# Patient Record
Sex: FEMALE | Race: WHITE | Employment: UNEMPLOYED | URBAN - METROPOLITAN AREA
[De-identification: names, ages, dates, MRNs, and addresses within clinical notes are randomized per-mention and may not be internally consistent; named-entity substitution may affect disease eponyms.]

---

## 2017-07-03 ENCOUNTER — HOSPITAL ENCOUNTER (OUTPATIENT)
Dept: WOMENS IMAGING | Age: 77
Discharge: HOME | End: 2017-07-03
Attending: INTERNAL MEDICINE | Admitting: INTERNAL MEDICINE

## 2017-07-03 DIAGNOSIS — Z12.31 VISIT FOR SCREENING MAMMOGRAM: ICD-10-CM

## 2017-07-03 DIAGNOSIS — Z12.31 ENCOUNTER FOR SCREENING MAMMOGRAM FOR MALIGNANT NEOPLASM OF BREAST: ICD-10-CM

## 2017-07-10 ENCOUNTER — HOSPITAL ENCOUNTER (OUTPATIENT)
Dept: ULTRASOUND IMAGING | Age: 77
Discharge: HOME | End: 2017-07-10
Attending: INTERNAL MEDICINE | Admitting: INTERNAL MEDICINE

## 2017-07-10 DIAGNOSIS — R92.8 OTHER ABNORMAL AND INCONCLUSIVE FINDINGS ON DIAGNOSTIC IMAGING OF BREAST: ICD-10-CM

## 2017-07-10 DIAGNOSIS — R92.8 ABNORMAL MAMMOGRAM: ICD-10-CM

## 2018-12-02 ENCOUNTER — HOSPITAL ENCOUNTER (INPATIENT)
Age: 78
LOS: 1 days | Discharge: HOME | DRG: 640 | End: 2018-12-03
Attending: EMERGENCY MEDICINE | Admitting: INTERNAL MEDICINE
Payer: MEDICARE

## 2018-12-02 ENCOUNTER — APPOINTMENT (OUTPATIENT)
Dept: CT IMAGING | Age: 78
DRG: 640 | End: 2018-12-02
Payer: MEDICARE

## 2018-12-02 ENCOUNTER — APPOINTMENT (OUTPATIENT)
Dept: GENERAL RADIOLOGY | Age: 78
DRG: 640 | End: 2018-12-02
Payer: MEDICARE

## 2018-12-02 DIAGNOSIS — R11.2 NON-INTRACTABLE VOMITING WITH NAUSEA, UNSPECIFIED VOMITING TYPE: ICD-10-CM

## 2018-12-02 DIAGNOSIS — E87.1 HYPONATREMIA: Primary | ICD-10-CM

## 2018-12-02 DIAGNOSIS — E87.6 HYPOKALEMIA: ICD-10-CM

## 2018-12-02 DIAGNOSIS — R41.82 ALTERED MENTAL STATUS, UNSPECIFIED ALTERED MENTAL STATUS TYPE: ICD-10-CM

## 2018-12-02 PROBLEM — G93.41 ACUTE METABOLIC ENCEPHALOPATHY: Status: ACTIVE | Noted: 2018-12-02

## 2018-12-02 LAB
A/G RATIO: 1.7 (ref 1.1–2.2)
ALBUMIN SERPL-MCNC: 4 G/DL (ref 3.4–5)
ALP BLD-CCNC: 37 U/L (ref 40–129)
ALT SERPL-CCNC: 13 U/L (ref 10–40)
ANION GAP SERPL CALCULATED.3IONS-SCNC: 15 MMOL/L (ref 3–16)
ANION GAP SERPL CALCULATED.3IONS-SCNC: 16 MMOL/L (ref 3–16)
AST SERPL-CCNC: 20 U/L (ref 15–37)
BASOPHILS ABSOLUTE: 0 K/UL (ref 0–0.2)
BASOPHILS RELATIVE PERCENT: 0.4 %
BILIRUB SERPL-MCNC: 0.8 MG/DL (ref 0–1)
BILIRUBIN URINE: NEGATIVE
BLOOD, URINE: NEGATIVE
BUN BLDV-MCNC: 7 MG/DL (ref 7–20)
BUN BLDV-MCNC: 8 MG/DL (ref 7–20)
CALCIUM SERPL-MCNC: 8.8 MG/DL (ref 8.3–10.6)
CALCIUM SERPL-MCNC: 8.8 MG/DL (ref 8.3–10.6)
CHLORIDE BLD-SCNC: 84 MMOL/L (ref 99–110)
CHLORIDE BLD-SCNC: 86 MMOL/L (ref 99–110)
CLARITY: CLEAR
CO2: 21 MMOL/L (ref 21–32)
CO2: 21 MMOL/L (ref 21–32)
COLOR: YELLOW
CORTISOL TOTAL: 37.6 UG/DL
CREAT SERPL-MCNC: 0.6 MG/DL (ref 0.6–1.2)
CREAT SERPL-MCNC: 0.7 MG/DL (ref 0.6–1.2)
CREATININE URINE: 21 MG/DL (ref 28–259)
EKG ATRIAL RATE: 79 BPM
EKG DIAGNOSIS: NORMAL
EKG P AXIS: -7 DEGREES
EKG P-R INTERVAL: 146 MS
EKG Q-T INTERVAL: 440 MS
EKG QRS DURATION: 72 MS
EKG QTC CALCULATION (BAZETT): 504 MS
EKG R AXIS: 10 DEGREES
EKG T AXIS: 17 DEGREES
EKG VENTRICULAR RATE: 79 BPM
EOSINOPHILS ABSOLUTE: 0 K/UL (ref 0–0.6)
EOSINOPHILS RELATIVE PERCENT: 0.1 %
GFR AFRICAN AMERICAN: >60
GFR AFRICAN AMERICAN: >60
GFR NON-AFRICAN AMERICAN: >60
GFR NON-AFRICAN AMERICAN: >60
GLOBULIN: 2.3 G/DL
GLUCOSE BLD-MCNC: 128 MG/DL (ref 70–99)
GLUCOSE BLD-MCNC: 150 MG/DL (ref 70–99)
GLUCOSE URINE: NEGATIVE MG/DL
HCT VFR BLD CALC: 32.9 % (ref 36–48)
HCT VFR BLD CALC: 34.2 % (ref 36–48)
HEMOGLOBIN: 11.7 G/DL (ref 12–16)
HEMOGLOBIN: 12.1 G/DL (ref 12–16)
KETONES, URINE: 40 MG/DL
LEUKOCYTE ESTERASE, URINE: NEGATIVE
LIPASE: 25 U/L (ref 13–60)
LYMPHOCYTES ABSOLUTE: 1 K/UL (ref 1–5.1)
LYMPHOCYTES RELATIVE PERCENT: 10.3 %
MAGNESIUM: 1.9 MG/DL (ref 1.8–2.4)
MCH RBC QN AUTO: 29 PG (ref 26–34)
MCH RBC QN AUTO: 29.5 PG (ref 26–34)
MCHC RBC AUTO-ENTMCNC: 35.4 G/DL (ref 31–36)
MCHC RBC AUTO-ENTMCNC: 35.4 G/DL (ref 31–36)
MCV RBC AUTO: 82.1 FL (ref 80–100)
MCV RBC AUTO: 83.3 FL (ref 80–100)
MICROSCOPIC EXAMINATION: ABNORMAL
MONOCYTES ABSOLUTE: 0.4 K/UL (ref 0–1.3)
MONOCYTES RELATIVE PERCENT: 4.3 %
NEUTROPHILS ABSOLUTE: 8.1 K/UL (ref 1.7–7.7)
NEUTROPHILS RELATIVE PERCENT: 84.9 %
NITRITE, URINE: NEGATIVE
OCCULT BLOOD, OTHER: NORMAL
OSMOLALITY URINE: 167 MOSM/KG (ref 390–1070)
OSMOLALITY URINE: 294 MOSM/KG (ref 390–1070)
PDW BLD-RTO: 12.8 % (ref 12.4–15.4)
PDW BLD-RTO: 12.9 % (ref 12.4–15.4)
PH UA: 7
PH, GASTRIC: NORMAL
PHOSPHORUS: 3.2 MG/DL (ref 2.5–4.9)
PLATELET # BLD: 267 K/UL (ref 135–450)
PLATELET # BLD: 294 K/UL (ref 135–450)
PMV BLD AUTO: 8 FL (ref 5–10.5)
PMV BLD AUTO: 8.6 FL (ref 5–10.5)
POTASSIUM SERPL-SCNC: 2.7 MMOL/L (ref 3.5–5.1)
POTASSIUM SERPL-SCNC: 4 MMOL/L (ref 3.5–5.1)
PRO-BNP: 235 PG/ML (ref 0–449)
PROTEIN UA: NEGATIVE MG/DL
RBC # BLD: 4.01 M/UL (ref 4–5.2)
RBC # BLD: 4.11 M/UL (ref 4–5.2)
SODIUM BLD-SCNC: 121 MMOL/L (ref 136–145)
SODIUM BLD-SCNC: 122 MMOL/L (ref 136–145)
SODIUM BLD-SCNC: 124 MMOL/L (ref 136–145)
SODIUM BLD-SCNC: 124 MMOL/L (ref 136–145)
SODIUM BLD-SCNC: 128 MMOL/L (ref 136–145)
SODIUM BLD-SCNC: 129 MMOL/L (ref 136–145)
SODIUM BLD-SCNC: 134 MMOL/L (ref 136–145)
SODIUM URINE: 103 MMOL/L
SODIUM URINE: <20 MMOL/L
SPECIFIC GRAVITY UA: 1.01
T4 FREE: 1.2 NG/DL (ref 0.9–1.8)
TOTAL PROTEIN: 6.3 G/DL (ref 6.4–8.2)
TROPONIN: <0.01 NG/ML
TSH SERPL DL<=0.05 MIU/L-ACNC: 1.42 UIU/ML (ref 0.27–4.2)
URIC ACID, SERUM: 3.4 MG/DL (ref 2.6–6)
URINE REFLEX TO CULTURE: ABNORMAL
URINE TYPE: ABNORMAL
UROBILINOGEN, URINE: 1 E.U./DL
WBC # BLD: 7.9 K/UL (ref 4–11)
WBC # BLD: 9.6 K/UL (ref 4–11)

## 2018-12-02 PROCEDURE — 84443 ASSAY THYROID STIM HORMONE: CPT

## 2018-12-02 PROCEDURE — 82570 ASSAY OF URINE CREATININE: CPT

## 2018-12-02 PROCEDURE — 84100 ASSAY OF PHOSPHORUS: CPT

## 2018-12-02 PROCEDURE — 85025 COMPLETE CBC W/AUTO DIFF WBC: CPT

## 2018-12-02 PROCEDURE — 99285 EMERGENCY DEPT VISIT HI MDM: CPT

## 2018-12-02 PROCEDURE — 84484 ASSAY OF TROPONIN QUANT: CPT

## 2018-12-02 PROCEDURE — 6360000002 HC RX W HCPCS: Performed by: PHYSICIAN ASSISTANT

## 2018-12-02 PROCEDURE — 93005 ELECTROCARDIOGRAM TRACING: CPT | Performed by: PHYSICIAN ASSISTANT

## 2018-12-02 PROCEDURE — 84439 ASSAY OF FREE THYROXINE: CPT

## 2018-12-02 PROCEDURE — 82533 TOTAL CORTISOL: CPT

## 2018-12-02 PROCEDURE — 70450 CT HEAD/BRAIN W/O DYE: CPT

## 2018-12-02 PROCEDURE — 6370000000 HC RX 637 (ALT 250 FOR IP): Performed by: INTERNAL MEDICINE

## 2018-12-02 PROCEDURE — 6360000002 HC RX W HCPCS: Performed by: INTERNAL MEDICINE

## 2018-12-02 PROCEDURE — 84550 ASSAY OF BLOOD/URIC ACID: CPT

## 2018-12-02 PROCEDURE — 81003 URINALYSIS AUTO W/O SCOPE: CPT

## 2018-12-02 PROCEDURE — 80053 COMPREHEN METABOLIC PANEL: CPT

## 2018-12-02 PROCEDURE — 85027 COMPLETE CBC AUTOMATED: CPT

## 2018-12-02 PROCEDURE — 6370000000 HC RX 637 (ALT 250 FOR IP): Performed by: PHYSICIAN ASSISTANT

## 2018-12-02 PROCEDURE — 36415 COLL VENOUS BLD VENIPUNCTURE: CPT

## 2018-12-02 PROCEDURE — 82271 OCCULT BLOOD OTHER SOURCES: CPT

## 2018-12-02 PROCEDURE — 2580000003 HC RX 258: Performed by: INTERNAL MEDICINE

## 2018-12-02 PROCEDURE — 83880 ASSAY OF NATRIURETIC PEPTIDE: CPT

## 2018-12-02 PROCEDURE — 96374 THER/PROPH/DIAG INJ IV PUSH: CPT

## 2018-12-02 PROCEDURE — 2060000000 HC ICU INTERMEDIATE R&B

## 2018-12-02 PROCEDURE — 93010 ELECTROCARDIOGRAM REPORT: CPT | Performed by: INTERNAL MEDICINE

## 2018-12-02 PROCEDURE — 83690 ASSAY OF LIPASE: CPT

## 2018-12-02 PROCEDURE — 84295 ASSAY OF SERUM SODIUM: CPT

## 2018-12-02 PROCEDURE — 84300 ASSAY OF URINE SODIUM: CPT

## 2018-12-02 PROCEDURE — 83935 ASSAY OF URINE OSMOLALITY: CPT

## 2018-12-02 PROCEDURE — 83735 ASSAY OF MAGNESIUM: CPT

## 2018-12-02 PROCEDURE — 71046 X-RAY EXAM CHEST 2 VIEWS: CPT

## 2018-12-02 RX ORDER — 0.9 % SODIUM CHLORIDE 0.9 %
500 INTRAVENOUS SOLUTION INTRAVENOUS ONCE
Status: DISCONTINUED | OUTPATIENT
Start: 2018-12-02 | End: 2018-12-02

## 2018-12-02 RX ORDER — SODIUM CHLORIDE 0.9 % (FLUSH) 0.9 %
10 SYRINGE (ML) INJECTION PRN
Status: DISCONTINUED | OUTPATIENT
Start: 2018-12-02 | End: 2018-12-03 | Stop reason: HOSPADM

## 2018-12-02 RX ORDER — SODIUM CHLORIDE 9 MG/ML
INJECTION, SOLUTION INTRAVENOUS CONTINUOUS
Status: DISCONTINUED | OUTPATIENT
Start: 2018-12-02 | End: 2018-12-03 | Stop reason: HOSPADM

## 2018-12-02 RX ORDER — POTASSIUM CHLORIDE 20 MEQ/1
40 TABLET, EXTENDED RELEASE ORAL ONCE
Status: COMPLETED | OUTPATIENT
Start: 2018-12-02 | End: 2018-12-02

## 2018-12-02 RX ORDER — SODIUM CHLORIDE 0.9 % (FLUSH) 0.9 %
10 SYRINGE (ML) INJECTION EVERY 12 HOURS SCHEDULED
Status: DISCONTINUED | OUTPATIENT
Start: 2018-12-02 | End: 2018-12-03 | Stop reason: HOSPADM

## 2018-12-02 RX ORDER — SODIUM CHLORIDE 9 MG/ML
INJECTION, SOLUTION INTRAVENOUS CONTINUOUS
Status: DISCONTINUED | OUTPATIENT
Start: 2018-12-02 | End: 2018-12-02

## 2018-12-02 RX ORDER — ONDANSETRON 2 MG/ML
4 INJECTION INTRAMUSCULAR; INTRAVENOUS ONCE
Status: COMPLETED | OUTPATIENT
Start: 2018-12-02 | End: 2018-12-02

## 2018-12-02 RX ORDER — ONDANSETRON 2 MG/ML
4 INJECTION INTRAMUSCULAR; INTRAVENOUS EVERY 4 HOURS PRN
Status: DISCONTINUED | OUTPATIENT
Start: 2018-12-02 | End: 2018-12-03 | Stop reason: HOSPADM

## 2018-12-02 RX ORDER — POTASSIUM CHLORIDE 750 MG/1
40 TABLET, FILM COATED, EXTENDED RELEASE ORAL ONCE
Status: COMPLETED | OUTPATIENT
Start: 2018-12-02 | End: 2018-12-02

## 2018-12-02 RX ORDER — FAMOTIDINE 20 MG/1
20 TABLET, FILM COATED ORAL 2 TIMES DAILY
Status: DISCONTINUED | OUTPATIENT
Start: 2018-12-02 | End: 2018-12-03 | Stop reason: HOSPADM

## 2018-12-02 RX ORDER — BISACODYL 10 MG
10 SUPPOSITORY, RECTAL RECTAL DAILY PRN
Status: DISCONTINUED | OUTPATIENT
Start: 2018-12-02 | End: 2018-12-03 | Stop reason: HOSPADM

## 2018-12-02 RX ORDER — DOCUSATE SODIUM 100 MG/1
100 CAPSULE, LIQUID FILLED ORAL 2 TIMES DAILY PRN
Status: DISCONTINUED | OUTPATIENT
Start: 2018-12-02 | End: 2018-12-03 | Stop reason: HOSPADM

## 2018-12-02 RX ADMIN — FAMOTIDINE 20 MG: 20 TABLET ORAL at 20:47

## 2018-12-02 RX ADMIN — SODIUM CHLORIDE: 9 INJECTION, SOLUTION INTRAVENOUS at 14:40

## 2018-12-02 RX ADMIN — ENOXAPARIN SODIUM 40 MG: 40 INJECTION SUBCUTANEOUS at 08:51

## 2018-12-02 RX ADMIN — FAMOTIDINE 20 MG: 20 TABLET ORAL at 08:51

## 2018-12-02 RX ADMIN — SODIUM CHLORIDE: 9 INJECTION, SOLUTION INTRAVENOUS at 04:02

## 2018-12-02 RX ADMIN — POTASSIUM CHLORIDE 40 MEQ: 20 TABLET, EXTENDED RELEASE ORAL at 02:07

## 2018-12-02 RX ADMIN — Medication 10 ML: at 20:47

## 2018-12-02 RX ADMIN — ONDANSETRON 4 MG: 2 INJECTION INTRAMUSCULAR; INTRAVENOUS at 02:07

## 2018-12-02 RX ADMIN — POTASSIUM CHLORIDE 40 MEQ: 750 TABLET, FILM COATED, EXTENDED RELEASE ORAL at 04:25

## 2018-12-02 ASSESSMENT — PAIN DESCRIPTION - DESCRIPTORS: DESCRIPTORS: DISCOMFORT;ACHING

## 2018-12-02 ASSESSMENT — ENCOUNTER SYMPTOMS
BACK PAIN: 0
ABDOMINAL PAIN: 0
SHORTNESS OF BREATH: 1
VOMITING: 1
COUGH: 0
NAUSEA: 1

## 2018-12-02 ASSESSMENT — PAIN DESCRIPTION - LOCATION
LOCATION: ABDOMEN
LOCATION: ABDOMEN

## 2018-12-02 ASSESSMENT — PAIN SCALES - GENERAL
PAINLEVEL_OUTOF10: 0
PAINLEVEL_OUTOF10: 0
PAINLEVEL_OUTOF10: 7
PAINLEVEL_OUTOF10: 0

## 2018-12-02 ASSESSMENT — PAIN DESCRIPTION - PAIN TYPE
TYPE: ACUTE PAIN
TYPE: ACUTE PAIN

## 2018-12-02 ASSESSMENT — PAIN DESCRIPTION - FREQUENCY: FREQUENCY: CONTINUOUS

## 2018-12-02 NOTE — ED TRIAGE NOTES
Pt presents to ED with cc of abdominal pain, nausea, vomiting, and difficulty breathing that started around 4pm yesterday. Pt states she has also had some intermittent mild confusion. PA at bedside to examine pt. Frank Bright.  Electronically signed by Paradise Dia RN on 12/2/2018 at 1:48 AM

## 2018-12-02 NOTE — H&P
intracranial abnormality. 2. Mild age-appropriate diffuse atrophy with minimal chronic small vessel ischemic changes. Assessment:   Principal Problem:    Hyponatremia  Active Problems:    Hypokalemia    Acute metabolic encephalopathy    Nausea and vomiting  Resolved Problems:    * No resolved hospital problems. *      Plan:     Hyponatremia (severe) - Currently symptomatic with increased confusion. Likely due to high free water intake and low solute intake. Will start correction with IV Normal Saline with a goal not to exceed 0.5 mEq/Hr. Nephrology will be consulted. - Checking Urine Sodium, Urine Creatinine, Urine Osmolarity, TSH, Serum Cortisol and Uric Acid    Acute metabolic encephalopathy - Will monitor and provide supportive care. Hypokalemia - replace Potassium and recheck in the am      Non-Intractable vomiting with nausea - Will provide symptomatic treatment with Zofran as needed, maintenance of fluids and electrolytes. DVT Prophylaxis: Lovenox   Diet: DIET GENERAL;  Code Status: Full Code  (Advanced care planning has been discussed with patient and/or responsible family member and is reflected in the code status. Further orders associated with this have been entered if appropriate)    Disposition: Anticipate that patient will remain in the hospital for 2 to 3 days depending on further evaluation and clinical course.      Pam Augustine MD

## 2018-12-02 NOTE — PLAN OF CARE
Problem: SAFETY  Goal: Free from accidental physical injury  Outcome: Ongoing  Patient remains free of injury so far this shift. Problem: DAILY CARE  Goal: Daily care needs are met  Outcome: Ongoing  Patient is able to make needs known and voices no unmet needs. Problem: PAIN  Goal: Patient's pain/discomfort is manageable  Outcome: Ongoing  Patient voices no pain or discomfort at this time. Problem: SKIN INTEGRITY  Goal: Skin integrity is maintained or improved  Outcome: Ongoing      Problem: KNOWLEDGE DEFICIT  Goal: Patient/S.O. demonstrates understanding of disease process, treatment plan, medications, and discharge instructions. Outcome: Ongoing      Problem: DISCHARGE BARRIERS  Goal: Patient's continuum of care needs are met  Outcome: Ongoing      Problem: Fluid Volume:  Goal: Ability to achieve a balanced intake and output will improve  Ability to achieve a balanced intake and output will improve   Outcome: Ongoing      Problem: Falls - Risk of:  Goal: Will remain free from falls  Will remain free from falls   Outcome: Ongoing  Patient remains free of falls so far this shift. Fall precautions in place. Patient encouraged to call for assistance when ambulating.    Goal: Absence of physical injury  Absence of physical injury   Outcome: Ongoing

## 2018-12-02 NOTE — CONSULTS
breath. GENITOURINARY:  No dysuria or hematuria. ENDOCRINE:  No diabetes. VASCULAR:  No claudication. PSYCHIATRIC:  No depression or anxiety. The rest of the systems negative. PAST HISTORY:  History of breast cancer, previous history of thyroid  surgery. PERSONAL/SOCIAL HISTORY:  No history of smoking, alcohol or drug abuse. 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 FAMILY HISTORY:  Not significant. MEDICATION:  What she can recall, was on hydrochlorothiazide for a long  time, aspirin, pravastatin. Could not give me the list of the rest of  her medications. ALLERGIES:  ATORVASTATIN, ROSUVASTATIN, and BUSPIRONE. PHYSICAL EXAMINATION:  GENERAL:  A very pleasant female, fully awake and alert, in no acute  distress, a little slow. VITAL SIGNS:  Blood pressure 116/50, temperature 98, respiratory rate  18, pulse 66. HEENT:  Head normocephalic, atraumatic. Conjunctivae, no icterus. CARDIAC:  He has normal heart sounds. NECK:  JVD was not raised. Carotids are normal.  She has a thyroid  scar. CHEST:  Clear to auscultation. ABDOMEN:  Soft, nontender. Bowel sounds present bilaterally. EXTREMITIES:  Lower extremities:  No edema. NEUROLOGIC:  She was awake and alert. No focal neurological signs. LABORATORY DATA ON ADMISSION:  Sodium 121, potassium 2.7, chloride 84,  CO2 21, BUN is 8, creatinine is 0.6. Her glucose was 150, albumin is 4. Liver function tests are normal.  Alkaline phos is low, 37. Urinalysis,  specific gravity 1.009 and 40 ketones. CT of the brain, no acute abnormality. ASSESSMENT AND PLAN:  Acute hyponatremia by description, could be some chronic component due  to hydrochlorothiazide. She has some nausea, vomiting which could have  also caused component of SIADH although urine _____ be dilute on  presentation. The most likely cause is thiazide with superadded nausea, vomiting.   At  this point, there is no rush to correct it too fast.  Aim is to bring  her

## 2018-12-02 NOTE — ED PROVIDER NOTES
I independently evaluated and obtained a history and physical on Barbara Romberg. All diagnostic, treatment, and disposition assistants were made to myself in conjunction the advanced practice provider. For further details of this patient's emergency department encounter, please see the advanced practice provider's documentation. History: Pt presents with dyspnea and vomiting. She has some sputum production with her coughing. She has also been feeling generally weak lately and tired. Physician Exam: Patient is well-appearing. No distress. Lungs are clear to auscultation. .  She is speaking in full sentences. Patient was found to have significant hyponatremia with hypokalemia. She will need to be admitted for further care. (Please note that portions of this note may have been completed with a voice recognition program. Efforts were made to edit the dictations but occasionally words are mis-transcribed.)      EKG on my interpretation shows sinus rhythm with occasional PVCs. Rate of 79 bpm.  Normal QRS axis. No significant ST elevation or depression or T-wave changes. No prior EKG available for comparison.      Otto Turcios MD  12/03/18 [id number]
Previous Medications    No medications on file       ALLERGIES     Patient has no known allergies. FAMILY HISTORY     No family history on file. No family status information on file. SOCIAL HISTORY          PHYSICAL EXAM    (up to 7 for level 4, 8 or more for level 5)     ED Triage Vitals [12/02/18 0044]   BP Temp Temp Source Pulse Resp SpO2 Height Weight   138/72 97.7 °F (36.5 °C) Oral 79 23 100 % -- --     Physical Exam   Constitutional: She is oriented to person, place, and time. She appears well-developed and well-nourished. No distress. HENT:   Head: Normocephalic and atraumatic. Eyes: Pupils are equal, round, and reactive to light. Neck: Normal range of motion. Cardiovascular: Normal rate and regular rhythm. Pulmonary/Chest: Effort normal. No respiratory distress. Abdominal: Soft. There is no tenderness. Musculoskeletal: Normal range of motion. Neurological: She is alert and oriented to person, place, and time. Skin: Skin is warm. Psychiatric: She has a normal mood and affect. Her behavior is normal.   Nursing note and vitals reviewed. DIAGNOSTIC RESULTS     EKG: All EKG's are interpreted by HILARIO Mijares in the absence of a cardiologist.    EKG interpreted by myself - please refer to attending physician's note for complete EKG interpretation:    Rhythm: sinus rhythm   No evidence of acute ischemia or injury. RADIOLOGY:   Non-plain film images such as CT, Ultrasound and MRI are read by the radiologist. Plain radiographic images are visualized and preliminarily interpreted by HILARIO Mijares with the below findings:    Reviewed radiologist's dictation. Interpretation per the Radiologist below, if available at the time of this note:    CT Head WO Contrast   Final Result   1. No acute intracranial abnormality. 2. Mild age-appropriate diffuse atrophy with minimal chronic small vessel   ischemic changes.          XR CHEST STANDARD (2 VW)   Final Result   No

## 2018-12-02 NOTE — PROGRESS NOTES
Hyponatremia  -symmptomatic with increased confusion. Likely due to high free water intake and low solute intake vs HCTZ . Renal on board   IV Normal Saline with a goal not to exceed 0.5 mEq/Hr. Max 126        Acute metabolic encephalopathy - Will monitor and provide supportive care.      Hypokalemia - replace Potassium and recheck in the am      Non-Intractable vomiting with nausea - Will provide symptomatic treatment with Zofran as needed, maintenance of fluids and electrolytes.     Christian Garcia

## 2018-12-03 VITALS
BODY MASS INDEX: 17.67 KG/M2 | HEART RATE: 65 BPM | TEMPERATURE: 98.2 F | WEIGHT: 106.04 LBS | SYSTOLIC BLOOD PRESSURE: 120 MMHG | DIASTOLIC BLOOD PRESSURE: 62 MMHG | OXYGEN SATURATION: 98 % | HEIGHT: 65 IN | RESPIRATION RATE: 19 BRPM

## 2018-12-03 LAB
ALBUMIN SERPL-MCNC: 3.6 G/DL (ref 3.4–5)
ANION GAP SERPL CALCULATED.3IONS-SCNC: 10 MMOL/L (ref 3–16)
BASOPHILS ABSOLUTE: 0 K/UL (ref 0–0.2)
BASOPHILS RELATIVE PERCENT: 0.8 %
BUN BLDV-MCNC: 6 MG/DL (ref 7–20)
CALCIUM SERPL-MCNC: 8.8 MG/DL (ref 8.3–10.6)
CHLORIDE BLD-SCNC: 97 MMOL/L (ref 99–110)
CO2: 24 MMOL/L (ref 21–32)
CREAT SERPL-MCNC: 0.7 MG/DL (ref 0.6–1.2)
EOSINOPHILS ABSOLUTE: 0.1 K/UL (ref 0–0.6)
EOSINOPHILS RELATIVE PERCENT: 2.4 %
GFR AFRICAN AMERICAN: >60
GFR NON-AFRICAN AMERICAN: >60
GLUCOSE BLD-MCNC: 94 MG/DL (ref 70–99)
HCT VFR BLD CALC: 36.4 % (ref 36–48)
HEMOGLOBIN: 12.5 G/DL (ref 12–16)
LYMPHOCYTES ABSOLUTE: 1.5 K/UL (ref 1–5.1)
LYMPHOCYTES RELATIVE PERCENT: 29.8 %
MAGNESIUM: 2.1 MG/DL (ref 1.8–2.4)
MCH RBC QN AUTO: 29.1 PG (ref 26–34)
MCHC RBC AUTO-ENTMCNC: 34.4 G/DL (ref 31–36)
MCV RBC AUTO: 84.6 FL (ref 80–100)
MONOCYTES ABSOLUTE: 0.4 K/UL (ref 0–1.3)
MONOCYTES RELATIVE PERCENT: 8.8 %
NEUTROPHILS ABSOLUTE: 2.9 K/UL (ref 1.7–7.7)
NEUTROPHILS RELATIVE PERCENT: 58.2 %
PDW BLD-RTO: 12.9 % (ref 12.4–15.4)
PHOSPHORUS: 2.4 MG/DL (ref 2.5–4.9)
PLATELET # BLD: 280 K/UL (ref 135–450)
PMV BLD AUTO: 8.2 FL (ref 5–10.5)
POTASSIUM REFLEX MAGNESIUM: 3.9 MMOL/L (ref 3.5–5.1)
POTASSIUM SERPL-SCNC: 3.9 MMOL/L (ref 3.5–5.1)
RBC # BLD: 4.3 M/UL (ref 4–5.2)
SODIUM BLD-SCNC: 129 MMOL/L (ref 136–145)
SODIUM BLD-SCNC: 131 MMOL/L (ref 136–145)
SODIUM BLD-SCNC: 131 MMOL/L (ref 136–145)
SODIUM BLD-SCNC: 132 MMOL/L (ref 136–145)
SODIUM BLD-SCNC: 133 MMOL/L (ref 136–145)
WBC # BLD: 4.9 K/UL (ref 4–11)

## 2018-12-03 PROCEDURE — 83735 ASSAY OF MAGNESIUM: CPT

## 2018-12-03 PROCEDURE — 6360000002 HC RX W HCPCS: Performed by: INTERNAL MEDICINE

## 2018-12-03 PROCEDURE — 80069 RENAL FUNCTION PANEL: CPT

## 2018-12-03 PROCEDURE — 36415 COLL VENOUS BLD VENIPUNCTURE: CPT

## 2018-12-03 PROCEDURE — 94760 N-INVAS EAR/PLS OXIMETRY 1: CPT

## 2018-12-03 PROCEDURE — 6370000000 HC RX 637 (ALT 250 FOR IP): Performed by: INTERNAL MEDICINE

## 2018-12-03 PROCEDURE — 84295 ASSAY OF SERUM SODIUM: CPT

## 2018-12-03 PROCEDURE — 85025 COMPLETE CBC W/AUTO DIFF WBC: CPT

## 2018-12-03 PROCEDURE — 2580000003 HC RX 258: Performed by: INTERNAL MEDICINE

## 2018-12-03 RX ORDER — PSEUDOEPHEDRINE HCL 60 MG/1
30 TABLET ORAL EVERY 4 HOURS PRN
COMMUNITY

## 2018-12-03 RX ORDER — PRAVASTATIN SODIUM 80 MG/1
80 TABLET ORAL DAILY
COMMUNITY

## 2018-12-03 RX ORDER — HYDROCHLOROTHIAZIDE 25 MG/1
25 TABLET ORAL DAILY
Status: ON HOLD | COMMUNITY
End: 2018-12-03 | Stop reason: HOSPADM

## 2018-12-03 RX ORDER — FLUTICASONE PROPIONATE 50 MCG
1 SPRAY, SUSPENSION (ML) NASAL DAILY
COMMUNITY

## 2018-12-03 RX ORDER — GUAIFENESIN 400 MG/1
400 TABLET ORAL 4 TIMES DAILY PRN
COMMUNITY

## 2018-12-03 RX ORDER — TAMOXIFEN CITRATE 10 MG/1
20 TABLET ORAL DAILY
COMMUNITY

## 2018-12-03 RX ORDER — PANTOPRAZOLE SODIUM 20 MG/1
20 TABLET, DELAYED RELEASE ORAL DAILY
COMMUNITY

## 2018-12-03 RX ORDER — LEVOTHYROXINE SODIUM 0.05 MG/1
50 TABLET ORAL DAILY
COMMUNITY

## 2018-12-03 RX ORDER — ASPIRIN 81 MG/1
81 TABLET ORAL DAILY
COMMUNITY

## 2018-12-03 RX ORDER — IBUPROFEN 400 MG/1
400 TABLET ORAL EVERY 8 HOURS PRN
COMMUNITY

## 2018-12-03 RX ADMIN — Medication 10 ML: at 09:58

## 2018-12-03 RX ADMIN — FAMOTIDINE 20 MG: 20 TABLET ORAL at 09:57

## 2018-12-03 RX ADMIN — ENOXAPARIN SODIUM 40 MG: 40 INJECTION SUBCUTANEOUS at 09:57

## 2018-12-03 NOTE — PROGRESS NOTES
Patient Name: Conchis Escobedo                                                    Primary Physician: Loren Mirza MD  Admitting Dx: [facility]                 Inpatient Progress Note                                         Assessment / Plan:     Hyponatremia. 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 vomiting , anxiety. She is under eval for hypothyroidism but here TSH normal.   Urine sodium 124, but osm low. 134 was a bad sample. Sodium came up 121---> 131 in 30 hrs. Need fluid restriction 60 oz per day. No diuretics especially HCTZ. Eat more eggs, protein,   Labs in 48 hrs to be done. She will get done at [facility]. She wants to keep doctors at [facility]. So I;ve advised her to get labs done on Wednesday and get it checked by PCP. Gave her script. Ok to discharge from renal stand point. Please call with any questions or concerns. [phone]. Godwin Gunter        Subjective: Interval hx:   Seen in room   afebrile   appetite fair   No nausea. Wants ot go home. Doesn ot want ot stay here. No HA, nausea, vomiting. Diarrhea blurry vision. CC:  Hyponatremia. , nausea, vomiting. 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 She has  previous history of breast cancer and thyroid disease for which she had  some what looks like partial thyroidectomy. She comes to emergency  because of one-day history of nausea, vomiting, increased confusion. She also has difficulty remembering things at all, what happened, and  could not concentrate and in the Emergency Department, she was found to  be hypokalemic with a potassium of 2.7 and also hyponatremic with a  sodium of 121. All her vital signs were stable. On presentation, her  urine specific gravity is 1.009 and she has 40 ketones although she is  not diabetic.     PMH:  Past Medical History:

## 2023-05-05 ENCOUNTER — HOSPITAL ENCOUNTER (INPATIENT)
Age: 83
DRG: 057 | End: 2023-05-05
Attending: PHYSICAL MEDICINE & REHABILITATION | Admitting: PHYSICAL MEDICINE & REHABILITATION
Payer: MEDICARE

## 2023-05-05 ENCOUNTER — APPOINTMENT (OUTPATIENT)
Dept: CT IMAGING | Age: 83
DRG: 057 | End: 2023-05-05
Attending: PHYSICAL MEDICINE & REHABILITATION
Payer: MEDICARE

## 2023-05-05 DIAGNOSIS — F41.9 ANXIETY: Primary | ICD-10-CM

## 2023-05-05 PROBLEM — I63.9 ACUTE ISCHEMIC STROKE (HCC): Status: ACTIVE | Noted: 2023-05-05

## 2023-05-05 PROCEDURE — 1280000000 HC REHAB R&B

## 2023-05-05 PROCEDURE — 51798 US URINE CAPACITY MEASURE: CPT

## 2023-05-05 PROCEDURE — 70450 CT HEAD/BRAIN W/O DYE: CPT

## 2023-05-05 PROCEDURE — 6370000000 HC RX 637 (ALT 250 FOR IP): Performed by: PHYSICAL MEDICINE & REHABILITATION

## 2023-05-05 RX ORDER — LEVOTHYROXINE SODIUM 0.05 MG/1
50 TABLET ORAL DAILY
Status: DISCONTINUED | OUTPATIENT
Start: 2023-05-06 | End: 2023-05-18 | Stop reason: HOSPADM

## 2023-05-05 RX ORDER — GABAPENTIN 100 MG/1
100 CAPSULE ORAL 3 TIMES DAILY
Status: DISCONTINUED | OUTPATIENT
Start: 2023-05-05 | End: 2023-05-15

## 2023-05-05 RX ORDER — CETIRIZINE HYDROCHLORIDE 10 MG/1
5 TABLET ORAL DAILY
Status: DISCONTINUED | OUTPATIENT
Start: 2023-05-06 | End: 2023-05-15

## 2023-05-05 RX ORDER — LISINOPRIL 5 MG/1
5 TABLET ORAL DAILY
Status: DISCONTINUED | OUTPATIENT
Start: 2023-05-06 | End: 2023-05-08

## 2023-05-05 RX ORDER — SENNA AND DOCUSATE SODIUM 50; 8.6 MG/1; MG/1
1 TABLET, FILM COATED ORAL 2 TIMES DAILY
Status: DISCONTINUED | OUTPATIENT
Start: 2023-05-05 | End: 2023-05-18 | Stop reason: HOSPADM

## 2023-05-05 RX ORDER — FLUTICASONE PROPIONATE 50 MCG
1 SPRAY, SUSPENSION (ML) NASAL DAILY
Status: DISCONTINUED | OUTPATIENT
Start: 2023-05-06 | End: 2023-05-08

## 2023-05-05 RX ORDER — ENOXAPARIN SODIUM 100 MG/ML
30 INJECTION SUBCUTANEOUS DAILY
Status: DISCONTINUED | OUTPATIENT
Start: 2023-05-06 | End: 2023-05-18 | Stop reason: HOSPADM

## 2023-05-05 RX ORDER — LANOLIN ALCOHOL/MO/W.PET/CERES
1 CREAM (GRAM) TOPICAL DAILY
Status: DISCONTINUED | OUTPATIENT
Start: 2023-05-06 | End: 2023-05-10

## 2023-05-05 RX ORDER — VITAMIN B COMPLEX
2000 TABLET ORAL DAILY
Status: DISCONTINUED | OUTPATIENT
Start: 2023-05-06 | End: 2023-05-18 | Stop reason: HOSPADM

## 2023-05-05 RX ORDER — ACETAMINOPHEN 325 MG/1
650 TABLET ORAL EVERY 6 HOURS PRN
Status: DISCONTINUED | OUTPATIENT
Start: 2023-05-05 | End: 2023-05-18 | Stop reason: HOSPADM

## 2023-05-05 RX ORDER — PRAVASTATIN SODIUM 40 MG
80 TABLET ORAL DAILY
Status: DISCONTINUED | OUTPATIENT
Start: 2023-05-06 | End: 2023-05-18 | Stop reason: HOSPADM

## 2023-05-05 RX ORDER — BISACODYL 10 MG
10 SUPPOSITORY, RECTAL RECTAL DAILY PRN
Status: DISCONTINUED | OUTPATIENT
Start: 2023-05-05 | End: 2023-05-18 | Stop reason: HOSPADM

## 2023-05-05 RX ORDER — LANOLIN ALCOHOL/MO/W.PET/CERES
3 CREAM (GRAM) TOPICAL NIGHTLY PRN
Status: DISCONTINUED | OUTPATIENT
Start: 2023-05-05 | End: 2023-05-18 | Stop reason: HOSPADM

## 2023-05-05 RX ORDER — POLYETHYLENE GLYCOL 3350 17 G/17G
17 POWDER, FOR SOLUTION ORAL DAILY PRN
Status: DISCONTINUED | OUTPATIENT
Start: 2023-05-05 | End: 2023-05-18 | Stop reason: HOSPADM

## 2023-05-05 RX ORDER — CARVEDILOL 25 MG/1
25 TABLET ORAL 2 TIMES DAILY WITH MEALS
Status: DISCONTINUED | OUTPATIENT
Start: 2023-05-05 | End: 2023-05-08

## 2023-05-05 RX ORDER — HYDRALAZINE HYDROCHLORIDE 10 MG/1
10 TABLET, FILM COATED ORAL EVERY 6 HOURS PRN
Status: DISCONTINUED | OUTPATIENT
Start: 2023-05-05 | End: 2023-05-12

## 2023-05-05 RX ORDER — ONDANSETRON 4 MG/1
4 TABLET, FILM COATED ORAL EVERY 8 HOURS PRN
Status: DISCONTINUED | OUTPATIENT
Start: 2023-05-05 | End: 2023-05-18 | Stop reason: HOSPADM

## 2023-05-05 RX ORDER — ASPIRIN 81 MG/1
81 TABLET ORAL DAILY
Status: DISCONTINUED | OUTPATIENT
Start: 2023-05-06 | End: 2023-05-18 | Stop reason: HOSPADM

## 2023-05-05 RX ADMIN — GABAPENTIN 100 MG: 100 CAPSULE ORAL at 19:47

## 2023-05-05 RX ADMIN — ACETAMINOPHEN 650 MG: 325 TABLET ORAL at 13:35

## 2023-05-05 RX ADMIN — SENNOSIDES AND DOCUSATE SODIUM 1 TABLET: 50; 8.6 TABLET ORAL at 13:36

## 2023-05-05 RX ADMIN — SENNOSIDES AND DOCUSATE SODIUM 1 TABLET: 50; 8.6 TABLET ORAL at 19:47

## 2023-05-05 RX ADMIN — CARVEDILOL 25 MG: 25 TABLET, FILM COATED ORAL at 16:22

## 2023-05-05 RX ADMIN — Medication 3 MG: at 19:47

## 2023-05-05 RX ADMIN — ACETAMINOPHEN 650 MG: 325 TABLET ORAL at 19:47

## 2023-05-05 ASSESSMENT — PAIN SCALES - GENERAL
PAINLEVEL_OUTOF10: 3
PAINLEVEL_OUTOF10: 5
PAINLEVEL_OUTOF10: 2
PAINLEVEL_OUTOF10: 3
PAINLEVEL_OUTOF10: 3

## 2023-05-05 ASSESSMENT — PAIN DESCRIPTION - DESCRIPTORS
DESCRIPTORS: ACHING
DESCRIPTORS: ACHING;SORE

## 2023-05-05 ASSESSMENT — PAIN DESCRIPTION - ORIENTATION
ORIENTATION: RIGHT
ORIENTATION: RIGHT

## 2023-05-05 ASSESSMENT — LIFESTYLE VARIABLES
HOW OFTEN DO YOU HAVE A DRINK CONTAINING ALCOHOL: NEVER
HOW MANY STANDARD DRINKS CONTAINING ALCOHOL DO YOU HAVE ON A TYPICAL DAY: PATIENT DOES NOT DRINK

## 2023-05-05 ASSESSMENT — PAIN - FUNCTIONAL ASSESSMENT
PAIN_FUNCTIONAL_ASSESSMENT: ACTIVITIES ARE NOT PREVENTED
PAIN_FUNCTIONAL_ASSESSMENT: ACTIVITIES ARE NOT PREVENTED

## 2023-05-05 ASSESSMENT — PAIN DESCRIPTION - LOCATION
LOCATION: EYE
LOCATION: EYE

## 2023-05-06 LAB
ANION GAP SERPL CALCULATED.3IONS-SCNC: 11 MMOL/L (ref 3–16)
BASOPHILS # BLD: 0 K/UL (ref 0–0.2)
BASOPHILS NFR BLD: 1 %
BUN SERPL-MCNC: 21 MG/DL (ref 7–20)
CALCIUM SERPL-MCNC: 8.7 MG/DL (ref 8.3–10.6)
CHLORIDE SERPL-SCNC: 97 MMOL/L (ref 99–110)
CO2 SERPL-SCNC: 21 MMOL/L (ref 21–32)
CREAT SERPL-MCNC: 0.9 MG/DL (ref 0.6–1.2)
DEPRECATED RDW RBC AUTO: 15.4 % (ref 12.4–15.4)
EOSINOPHIL # BLD: 0.1 K/UL (ref 0–0.6)
EOSINOPHIL NFR BLD: 2.9 %
GFR SERPLBLD CREATININE-BSD FMLA CKD-EPI: >60 ML/MIN/{1.73_M2}
GLUCOSE BLD-MCNC: 134 MG/DL (ref 70–99)
GLUCOSE SERPL-MCNC: 84 MG/DL (ref 70–99)
HCT VFR BLD AUTO: 35.2 % (ref 36–48)
HGB BLD-MCNC: 11.8 G/DL (ref 12–16)
LYMPHOCYTES # BLD: 1 K/UL (ref 1–5.1)
LYMPHOCYTES NFR BLD: 20.3 %
MCH RBC QN AUTO: 26.8 PG (ref 26–34)
MCHC RBC AUTO-ENTMCNC: 33.4 G/DL (ref 31–36)
MCV RBC AUTO: 80.1 FL (ref 80–100)
MONOCYTES # BLD: 0.4 K/UL (ref 0–1.3)
MONOCYTES NFR BLD: 8.6 %
NEUTROPHILS # BLD: 3.2 K/UL (ref 1.7–7.7)
NEUTROPHILS NFR BLD: 67.2 %
PERFORMED ON: ABNORMAL
PLATELET # BLD AUTO: 160 K/UL (ref 135–450)
PMV BLD AUTO: 9.1 FL (ref 5–10.5)
POTASSIUM SERPL-SCNC: 3.8 MMOL/L (ref 3.5–5.1)
PREALB SERPL-MCNC: 12 MG/DL (ref 20–40)
RBC # BLD AUTO: 4.4 M/UL (ref 4–5.2)
SODIUM SERPL-SCNC: 129 MMOL/L (ref 136–145)
WBC # BLD AUTO: 4.8 K/UL (ref 4–11)

## 2023-05-06 PROCEDURE — 1280000000 HC REHAB R&B

## 2023-05-06 PROCEDURE — 51798 US URINE CAPACITY MEASURE: CPT

## 2023-05-06 PROCEDURE — 36415 COLL VENOUS BLD VENIPUNCTURE: CPT

## 2023-05-06 PROCEDURE — 97535 SELF CARE MNGMENT TRAINING: CPT

## 2023-05-06 PROCEDURE — 94760 N-INVAS EAR/PLS OXIMETRY 1: CPT

## 2023-05-06 PROCEDURE — 84134 ASSAY OF PREALBUMIN: CPT

## 2023-05-06 PROCEDURE — 92523 SPEECH SOUND LANG COMPREHEN: CPT

## 2023-05-06 PROCEDURE — 97112 NEUROMUSCULAR REEDUCATION: CPT

## 2023-05-06 PROCEDURE — 2580000003 HC RX 258: Performed by: STUDENT IN AN ORGANIZED HEALTH CARE EDUCATION/TRAINING PROGRAM

## 2023-05-06 PROCEDURE — 80048 BASIC METABOLIC PNL TOTAL CA: CPT

## 2023-05-06 PROCEDURE — 85025 COMPLETE CBC W/AUTO DIFF WBC: CPT

## 2023-05-06 PROCEDURE — 97530 THERAPEUTIC ACTIVITIES: CPT

## 2023-05-06 PROCEDURE — 97167 OT EVAL HIGH COMPLEX 60 MIN: CPT

## 2023-05-06 PROCEDURE — 6370000000 HC RX 637 (ALT 250 FOR IP): Performed by: PHYSICAL MEDICINE & REHABILITATION

## 2023-05-06 PROCEDURE — 6360000002 HC RX W HCPCS: Performed by: PHYSICAL MEDICINE & REHABILITATION

## 2023-05-06 RX ORDER — SODIUM CHLORIDE, SODIUM LACTATE, POTASSIUM CHLORIDE, AND CALCIUM CHLORIDE .6; .31; .03; .02 G/100ML; G/100ML; G/100ML; G/100ML
500 INJECTION, SOLUTION INTRAVENOUS ONCE
Status: COMPLETED | OUTPATIENT
Start: 2023-05-06 | End: 2023-05-06

## 2023-05-06 RX ADMIN — LEVOTHYROXINE SODIUM 50 MCG: 0.05 TABLET ORAL at 07:46

## 2023-05-06 RX ADMIN — CETIRIZINE HYDROCHLORIDE 5 MG: 10 TABLET, FILM COATED ORAL at 07:46

## 2023-05-06 RX ADMIN — GABAPENTIN 100 MG: 100 CAPSULE ORAL at 20:09

## 2023-05-06 RX ADMIN — ENOXAPARIN SODIUM 30 MG: 100 INJECTION SUBCUTANEOUS at 07:46

## 2023-05-06 RX ADMIN — SODIUM CHLORIDE, POTASSIUM CHLORIDE, SODIUM LACTATE AND CALCIUM CHLORIDE 500 ML: 600; 310; 30; 20 INJECTION, SOLUTION INTRAVENOUS at 14:45

## 2023-05-06 RX ADMIN — SENNOSIDES AND DOCUSATE SODIUM 1 TABLET: 50; 8.6 TABLET ORAL at 07:46

## 2023-05-06 RX ADMIN — POLYETHYLENE GLYCOL 3350 17 G: 17 POWDER, FOR SOLUTION ORAL at 08:00

## 2023-05-06 RX ADMIN — LISINOPRIL 5 MG: 5 TABLET ORAL at 07:46

## 2023-05-06 RX ADMIN — ASPIRIN 81 MG: 81 TABLET, COATED ORAL at 07:46

## 2023-05-06 RX ADMIN — PRAVASTATIN SODIUM 80 MG: 40 TABLET ORAL at 07:45

## 2023-05-06 RX ADMIN — Medication 2000 UNITS: at 07:46

## 2023-05-06 RX ADMIN — GABAPENTIN 100 MG: 100 CAPSULE ORAL at 07:46

## 2023-05-06 RX ADMIN — FLUTICASONE PROPIONATE 1 SPRAY: 50 SPRAY, METERED NASAL at 07:48

## 2023-05-06 RX ADMIN — GABAPENTIN 100 MG: 100 CAPSULE ORAL at 13:20

## 2023-05-06 RX ADMIN — Medication 1 TABLET: at 09:43

## 2023-05-06 RX ADMIN — CARVEDILOL 25 MG: 25 TABLET, FILM COATED ORAL at 07:46

## 2023-05-07 PROCEDURE — 94760 N-INVAS EAR/PLS OXIMETRY 1: CPT

## 2023-05-07 PROCEDURE — 6360000002 HC RX W HCPCS: Performed by: PHYSICAL MEDICINE & REHABILITATION

## 2023-05-07 PROCEDURE — 1280000000 HC REHAB R&B

## 2023-05-07 PROCEDURE — 6370000000 HC RX 637 (ALT 250 FOR IP): Performed by: PHYSICAL MEDICINE & REHABILITATION

## 2023-05-07 RX ADMIN — LISINOPRIL 5 MG: 5 TABLET ORAL at 07:55

## 2023-05-07 RX ADMIN — FLUTICASONE PROPIONATE 1 SPRAY: 50 SPRAY, METERED NASAL at 07:56

## 2023-05-07 RX ADMIN — LEVOTHYROXINE SODIUM 50 MCG: 0.05 TABLET ORAL at 05:25

## 2023-05-07 RX ADMIN — ENOXAPARIN SODIUM 30 MG: 100 INJECTION SUBCUTANEOUS at 07:55

## 2023-05-07 RX ADMIN — GABAPENTIN 100 MG: 100 CAPSULE ORAL at 07:55

## 2023-05-07 RX ADMIN — SENNOSIDES AND DOCUSATE SODIUM 1 TABLET: 50; 8.6 TABLET ORAL at 07:55

## 2023-05-07 RX ADMIN — GABAPENTIN 100 MG: 100 CAPSULE ORAL at 19:36

## 2023-05-07 RX ADMIN — ASPIRIN 81 MG: 81 TABLET, COATED ORAL at 07:54

## 2023-05-07 RX ADMIN — Medication 2000 UNITS: at 07:55

## 2023-05-07 RX ADMIN — GABAPENTIN 100 MG: 100 CAPSULE ORAL at 13:19

## 2023-05-07 RX ADMIN — CETIRIZINE HYDROCHLORIDE 5 MG: 10 TABLET, FILM COATED ORAL at 07:55

## 2023-05-07 RX ADMIN — Medication 1 TABLET: at 08:30

## 2023-05-07 RX ADMIN — PRAVASTATIN SODIUM 80 MG: 40 TABLET ORAL at 07:54

## 2023-05-07 RX ADMIN — CARVEDILOL 25 MG: 25 TABLET, FILM COATED ORAL at 07:55

## 2023-05-08 LAB
ANION GAP SERPL CALCULATED.3IONS-SCNC: 9 MMOL/L (ref 3–16)
BASOPHILS # BLD: 0 K/UL (ref 0–0.2)
BASOPHILS NFR BLD: 0.7 %
BUN SERPL-MCNC: 16 MG/DL (ref 7–20)
CALCIUM SERPL-MCNC: 8.8 MG/DL (ref 8.3–10.6)
CHLORIDE SERPL-SCNC: 103 MMOL/L (ref 99–110)
CO2 SERPL-SCNC: 22 MMOL/L (ref 21–32)
CREAT SERPL-MCNC: 1 MG/DL (ref 0.6–1.2)
DEPRECATED RDW RBC AUTO: 15.5 % (ref 12.4–15.4)
EOSINOPHIL # BLD: 0.2 K/UL (ref 0–0.6)
EOSINOPHIL NFR BLD: 3.7 %
GFR SERPLBLD CREATININE-BSD FMLA CKD-EPI: 55 ML/MIN/{1.73_M2}
GLUCOSE SERPL-MCNC: 97 MG/DL (ref 70–99)
HCT VFR BLD AUTO: 38.5 % (ref 36–48)
HGB BLD-MCNC: 12.6 G/DL (ref 12–16)
LYMPHOCYTES # BLD: 1.2 K/UL (ref 1–5.1)
LYMPHOCYTES NFR BLD: 23.3 %
MCH RBC QN AUTO: 26.6 PG (ref 26–34)
MCHC RBC AUTO-ENTMCNC: 32.6 G/DL (ref 31–36)
MCV RBC AUTO: 81.6 FL (ref 80–100)
MONOCYTES # BLD: 0.3 K/UL (ref 0–1.3)
MONOCYTES NFR BLD: 5.8 %
NEUTROPHILS # BLD: 3.4 K/UL (ref 1.7–7.7)
NEUTROPHILS NFR BLD: 66.5 %
PLATELET # BLD AUTO: 189 K/UL (ref 135–450)
PMV BLD AUTO: 9.5 FL (ref 5–10.5)
POTASSIUM SERPL-SCNC: 3.6 MMOL/L (ref 3.5–5.1)
RBC # BLD AUTO: 4.72 M/UL (ref 4–5.2)
SODIUM SERPL-SCNC: 134 MMOL/L (ref 136–145)
WBC # BLD AUTO: 5.1 K/UL (ref 4–11)

## 2023-05-08 PROCEDURE — 97530 THERAPEUTIC ACTIVITIES: CPT

## 2023-05-08 PROCEDURE — 97129 THER IVNTJ 1ST 15 MIN: CPT

## 2023-05-08 PROCEDURE — 80048 BASIC METABOLIC PNL TOTAL CA: CPT

## 2023-05-08 PROCEDURE — 97110 THERAPEUTIC EXERCISES: CPT

## 2023-05-08 PROCEDURE — 97535 SELF CARE MNGMENT TRAINING: CPT

## 2023-05-08 PROCEDURE — 94760 N-INVAS EAR/PLS OXIMETRY 1: CPT

## 2023-05-08 PROCEDURE — 97130 THER IVNTJ EA ADDL 15 MIN: CPT

## 2023-05-08 PROCEDURE — 6370000000 HC RX 637 (ALT 250 FOR IP): Performed by: PHYSICAL MEDICINE & REHABILITATION

## 2023-05-08 PROCEDURE — 36415 COLL VENOUS BLD VENIPUNCTURE: CPT

## 2023-05-08 PROCEDURE — 97162 PT EVAL MOD COMPLEX 30 MIN: CPT

## 2023-05-08 PROCEDURE — 6360000002 HC RX W HCPCS: Performed by: PHYSICAL MEDICINE & REHABILITATION

## 2023-05-08 PROCEDURE — 1280000000 HC REHAB R&B

## 2023-05-08 PROCEDURE — 85025 COMPLETE CBC W/AUTO DIFF WBC: CPT

## 2023-05-08 PROCEDURE — 92507 TX SP LANG VOICE COMM INDIV: CPT

## 2023-05-08 PROCEDURE — 97116 GAIT TRAINING THERAPY: CPT

## 2023-05-08 RX ORDER — LISINOPRIL 40 MG/1
40 TABLET ORAL DAILY
Status: ON HOLD | COMMUNITY
End: 2023-05-17 | Stop reason: SDUPTHER

## 2023-05-08 RX ORDER — CARVEDILOL 25 MG/1
25 TABLET ORAL 2 TIMES DAILY WITH MEALS
Status: ON HOLD | COMMUNITY
End: 2023-05-17 | Stop reason: SDUPTHER

## 2023-05-08 RX ORDER — ACETAMINOPHEN 325 MG/1
325-650 TABLET ORAL EVERY 6 HOURS PRN
COMMUNITY

## 2023-05-08 RX ORDER — GUAIFENESIN 600 MG/1
600 TABLET, EXTENDED RELEASE ORAL 2 TIMES DAILY
Status: DISCONTINUED | OUTPATIENT
Start: 2023-05-08 | End: 2023-05-18 | Stop reason: HOSPADM

## 2023-05-08 RX ORDER — CARVEDILOL 12.5 MG/1
12.5 TABLET ORAL 2 TIMES DAILY WITH MEALS
Status: DISCONTINUED | OUTPATIENT
Start: 2023-05-08 | End: 2023-05-18 | Stop reason: HOSPADM

## 2023-05-08 RX ORDER — FLUTICASONE PROPIONATE 50 MCG
1 SPRAY, SUSPENSION (ML) NASAL 2 TIMES DAILY
Status: DISCONTINUED | OUTPATIENT
Start: 2023-05-08 | End: 2023-05-18 | Stop reason: HOSPADM

## 2023-05-08 RX ORDER — LISINOPRIL 10 MG/1
10 TABLET ORAL DAILY
Status: DISCONTINUED | OUTPATIENT
Start: 2023-05-09 | End: 2023-05-11

## 2023-05-08 RX ORDER — LORATADINE 10 MG/1
10 TABLET ORAL DAILY
COMMUNITY

## 2023-05-08 RX ADMIN — GABAPENTIN 100 MG: 100 CAPSULE ORAL at 14:06

## 2023-05-08 RX ADMIN — Medication 1 TABLET: at 09:21

## 2023-05-08 RX ADMIN — GABAPENTIN 100 MG: 100 CAPSULE ORAL at 20:44

## 2023-05-08 RX ADMIN — SENNOSIDES AND DOCUSATE SODIUM 1 TABLET: 50; 8.6 TABLET ORAL at 07:31

## 2023-05-08 RX ADMIN — GUAIFENESIN 600 MG: 600 TABLET ORAL at 23:10

## 2023-05-08 RX ADMIN — SENNOSIDES AND DOCUSATE SODIUM 1 TABLET: 50; 8.6 TABLET ORAL at 20:44

## 2023-05-08 RX ADMIN — CARVEDILOL 12.5 MG: 12.5 TABLET, FILM COATED ORAL at 16:24

## 2023-05-08 RX ADMIN — Medication 2 SPRAY: at 20:44

## 2023-05-08 RX ADMIN — Medication 3 MG: at 20:44

## 2023-05-08 RX ADMIN — FLUTICASONE PROPIONATE 1 SPRAY: 50 SPRAY, METERED NASAL at 07:32

## 2023-05-08 RX ADMIN — CETIRIZINE HYDROCHLORIDE 5 MG: 10 TABLET, FILM COATED ORAL at 07:31

## 2023-05-08 RX ADMIN — HYDRALAZINE HYDROCHLORIDE 10 MG: 10 TABLET, FILM COATED ORAL at 07:30

## 2023-05-08 RX ADMIN — Medication 2000 UNITS: at 07:31

## 2023-05-08 RX ADMIN — PRAVASTATIN SODIUM 80 MG: 40 TABLET ORAL at 07:31

## 2023-05-08 RX ADMIN — LEVOTHYROXINE SODIUM 50 MCG: 0.05 TABLET ORAL at 06:25

## 2023-05-08 RX ADMIN — LISINOPRIL 5 MG: 5 TABLET ORAL at 07:31

## 2023-05-08 RX ADMIN — CARVEDILOL 25 MG: 25 TABLET, FILM COATED ORAL at 07:31

## 2023-05-08 RX ADMIN — ACETAMINOPHEN 650 MG: 325 TABLET ORAL at 16:24

## 2023-05-08 RX ADMIN — ENOXAPARIN SODIUM 30 MG: 100 INJECTION SUBCUTANEOUS at 07:31

## 2023-05-08 RX ADMIN — ACETAMINOPHEN 650 MG: 325 TABLET ORAL at 20:42

## 2023-05-08 RX ADMIN — GABAPENTIN 100 MG: 100 CAPSULE ORAL at 07:31

## 2023-05-08 RX ADMIN — ASPIRIN 81 MG: 81 TABLET, COATED ORAL at 07:31

## 2023-05-08 RX ADMIN — FLUTICASONE PROPIONATE 1 SPRAY: 50 SPRAY, METERED NASAL at 23:11

## 2023-05-08 ASSESSMENT — PAIN DESCRIPTION - FREQUENCY
FREQUENCY: CONTINUOUS
FREQUENCY: INTERMITTENT

## 2023-05-08 ASSESSMENT — PAIN SCALES - GENERAL
PAINLEVEL_OUTOF10: 0
PAINLEVEL_OUTOF10: 0
PAINLEVEL_OUTOF10: 3
PAINLEVEL_OUTOF10: 5

## 2023-05-08 ASSESSMENT — PAIN DESCRIPTION - LOCATION
LOCATION: FACE;NECK
LOCATION: HEAD

## 2023-05-08 ASSESSMENT — PAIN DESCRIPTION - ONSET
ONSET: ON-GOING
ONSET: ON-GOING

## 2023-05-08 ASSESSMENT — PAIN - FUNCTIONAL ASSESSMENT: PAIN_FUNCTIONAL_ASSESSMENT: ACTIVITIES ARE NOT PREVENTED

## 2023-05-08 ASSESSMENT — PAIN DESCRIPTION - ORIENTATION: ORIENTATION: RIGHT

## 2023-05-08 ASSESSMENT — PAIN DESCRIPTION - PAIN TYPE
TYPE: ACUTE PAIN
TYPE: ACUTE PAIN

## 2023-05-08 ASSESSMENT — PAIN DESCRIPTION - DESCRIPTORS
DESCRIPTORS: NAGGING
DESCRIPTORS: ACHING

## 2023-05-08 NOTE — CARE COORDINATION
Chart Reviewed. Met with patient to introduce  role, initiate discussion regarding DC planning and to inform her of weekly Team Conferences to review her progress. SOCIAL WORK ASSESSMENT      GOAL:   Her goal is to return home but states multiple times, \"I'm not ready to return home. \"      HOME SITUATION:  Pt lives alone, apartment that is on the third floor and the elevators are broken. She reports she was independent with her home maintenance needs and personal care needs prior to admit. She can drive but does not have a car. [place] picks her up for MD appts. Her groceries she has a friend to assist her. Pcp:  Dr Elicia Ricardo   at [address] [place] [address]:   All meds are from [place] and they deliver to her. PRIOR LEVEL OF FUNCTIONING:       PERSONAL CARE:    independent                                                                       DRIVES:no                                                                     FINANCES:independent                                                                   MEALS:    independent                             GROCERY SHOPS: assisted from friend      DME CURRENTLY AT HOME:   wh walker      CURRENT HOME CARE/SERVICES:  none. Informed her of possible post acute services such as home care or outpatient therapies. She is agreeable to home care services. PREFERRED HOME CARE:  To be determined. TEAM CONFERENCE DAY:  Thursdays. Informed her of weekly Team Conferences where Team will review her progress, goals, DME and DC date. This worker will update her weekly and plan for DC needs. LSW informed patient of preferred  time on date of discharge which is between 10 - 12 noon. LSW informed patient of recommendation for PCP visit within 7 days post discharge.     TRANSPORTATION:    she denied having missed any appts or needs for daily living in the last 12 months due to lack of

## 2023-05-09 PROCEDURE — 94760 N-INVAS EAR/PLS OXIMETRY 1: CPT

## 2023-05-09 PROCEDURE — 97129 THER IVNTJ 1ST 15 MIN: CPT

## 2023-05-09 PROCEDURE — 97530 THERAPEUTIC ACTIVITIES: CPT

## 2023-05-09 PROCEDURE — 6360000002 HC RX W HCPCS: Performed by: PHYSICAL MEDICINE & REHABILITATION

## 2023-05-09 PROCEDURE — 97112 NEUROMUSCULAR REEDUCATION: CPT

## 2023-05-09 PROCEDURE — 92507 TX SP LANG VOICE COMM INDIV: CPT

## 2023-05-09 PROCEDURE — 97110 THERAPEUTIC EXERCISES: CPT

## 2023-05-09 PROCEDURE — 97116 GAIT TRAINING THERAPY: CPT

## 2023-05-09 PROCEDURE — 97130 THER IVNTJ EA ADDL 15 MIN: CPT

## 2023-05-09 PROCEDURE — 6370000000 HC RX 637 (ALT 250 FOR IP): Performed by: PHYSICAL MEDICINE & REHABILITATION

## 2023-05-09 PROCEDURE — 1280000000 HC REHAB R&B

## 2023-05-09 PROCEDURE — 97535 SELF CARE MNGMENT TRAINING: CPT

## 2023-05-09 RX ORDER — ALPRAZOLAM 0.25 MG/1
0.25 TABLET ORAL NIGHTLY PRN
Status: DISCONTINUED | OUTPATIENT
Start: 2023-05-09 | End: 2023-05-18 | Stop reason: HOSPADM

## 2023-05-09 RX ADMIN — GUAIFENESIN 600 MG: 600 TABLET ORAL at 07:54

## 2023-05-09 RX ADMIN — Medication 2000 UNITS: at 07:53

## 2023-05-09 RX ADMIN — GUAIFENESIN 600 MG: 600 TABLET ORAL at 20:21

## 2023-05-09 RX ADMIN — GABAPENTIN 100 MG: 100 CAPSULE ORAL at 20:21

## 2023-05-09 RX ADMIN — PRAVASTATIN SODIUM 80 MG: 40 TABLET ORAL at 07:53

## 2023-05-09 RX ADMIN — CARVEDILOL 12.5 MG: 12.5 TABLET, FILM COATED ORAL at 07:57

## 2023-05-09 RX ADMIN — ALPRAZOLAM 0.25 MG: 0.25 TABLET ORAL at 20:26

## 2023-05-09 RX ADMIN — LISINOPRIL 10 MG: 10 TABLET ORAL at 07:53

## 2023-05-09 RX ADMIN — Medication 1 TABLET: at 12:37

## 2023-05-09 RX ADMIN — CETIRIZINE HYDROCHLORIDE 5 MG: 10 TABLET, FILM COATED ORAL at 07:54

## 2023-05-09 RX ADMIN — GABAPENTIN 100 MG: 100 CAPSULE ORAL at 07:54

## 2023-05-09 RX ADMIN — Medication 3 MG: at 20:26

## 2023-05-09 RX ADMIN — SENNOSIDES AND DOCUSATE SODIUM 1 TABLET: 50; 8.6 TABLET ORAL at 20:21

## 2023-05-09 RX ADMIN — CARVEDILOL 12.5 MG: 12.5 TABLET, FILM COATED ORAL at 17:11

## 2023-05-09 RX ADMIN — HYDRALAZINE HYDROCHLORIDE 10 MG: 10 TABLET, FILM COATED ORAL at 20:21

## 2023-05-09 RX ADMIN — Medication 2 SPRAY: at 20:12

## 2023-05-09 RX ADMIN — FLUTICASONE PROPIONATE 1 SPRAY: 50 SPRAY, METERED NASAL at 20:22

## 2023-05-09 RX ADMIN — ASPIRIN 81 MG: 81 TABLET, COATED ORAL at 07:53

## 2023-05-09 RX ADMIN — FLUTICASONE PROPIONATE 1 SPRAY: 50 SPRAY, METERED NASAL at 08:02

## 2023-05-09 RX ADMIN — SENNOSIDES AND DOCUSATE SODIUM 1 TABLET: 50; 8.6 TABLET ORAL at 07:53

## 2023-05-09 RX ADMIN — GABAPENTIN 100 MG: 100 CAPSULE ORAL at 12:38

## 2023-05-09 RX ADMIN — LEVOTHYROXINE SODIUM 50 MCG: 0.05 TABLET ORAL at 06:16

## 2023-05-09 RX ADMIN — ENOXAPARIN SODIUM 30 MG: 100 INJECTION SUBCUTANEOUS at 07:52

## 2023-05-09 RX ADMIN — ACETAMINOPHEN 650 MG: 325 TABLET ORAL at 12:37

## 2023-05-09 ASSESSMENT — PAIN DESCRIPTION - ONSET: ONSET: ON-GOING

## 2023-05-09 ASSESSMENT — PAIN DESCRIPTION - LOCATION: LOCATION: HEAD

## 2023-05-09 ASSESSMENT — PAIN SCALES - GENERAL
PAINLEVEL_OUTOF10: 8
PAINLEVEL_OUTOF10: 0
PAINLEVEL_OUTOF10: 0

## 2023-05-09 ASSESSMENT — PAIN DESCRIPTION - PAIN TYPE: TYPE: ACUTE PAIN

## 2023-05-09 ASSESSMENT — PAIN DESCRIPTION - DESCRIPTORS: DESCRIPTORS: ACHING

## 2023-05-09 ASSESSMENT — PAIN DESCRIPTION - ORIENTATION: ORIENTATION: OUTER

## 2023-05-09 ASSESSMENT — PAIN DESCRIPTION - FREQUENCY: FREQUENCY: CONTINUOUS

## 2023-05-09 ASSESSMENT — PAIN - FUNCTIONAL ASSESSMENT: PAIN_FUNCTIONAL_ASSESSMENT: ACTIVITIES ARE NOT PREVENTED

## 2023-05-10 PROCEDURE — 6370000000 HC RX 637 (ALT 250 FOR IP): Performed by: PHYSICAL MEDICINE & REHABILITATION

## 2023-05-10 PROCEDURE — 97535 SELF CARE MNGMENT TRAINING: CPT

## 2023-05-10 PROCEDURE — 94760 N-INVAS EAR/PLS OXIMETRY 1: CPT

## 2023-05-10 PROCEDURE — 1280000000 HC REHAB R&B

## 2023-05-10 PROCEDURE — 97129 THER IVNTJ 1ST 15 MIN: CPT

## 2023-05-10 PROCEDURE — 97110 THERAPEUTIC EXERCISES: CPT

## 2023-05-10 PROCEDURE — 97112 NEUROMUSCULAR REEDUCATION: CPT

## 2023-05-10 PROCEDURE — 92507 TX SP LANG VOICE COMM INDIV: CPT

## 2023-05-10 PROCEDURE — 97530 THERAPEUTIC ACTIVITIES: CPT

## 2023-05-10 PROCEDURE — 6360000002 HC RX W HCPCS: Performed by: PHYSICAL MEDICINE & REHABILITATION

## 2023-05-10 PROCEDURE — 97116 GAIT TRAINING THERAPY: CPT

## 2023-05-10 RX ADMIN — CARVEDILOL 12.5 MG: 12.5 TABLET, FILM COATED ORAL at 09:01

## 2023-05-10 RX ADMIN — GABAPENTIN 100 MG: 100 CAPSULE ORAL at 09:00

## 2023-05-10 RX ADMIN — SENNOSIDES AND DOCUSATE SODIUM 1 TABLET: 50; 8.6 TABLET ORAL at 19:36

## 2023-05-10 RX ADMIN — GABAPENTIN 100 MG: 100 CAPSULE ORAL at 19:36

## 2023-05-10 RX ADMIN — FLUTICASONE PROPIONATE 1 SPRAY: 50 SPRAY, METERED NASAL at 19:38

## 2023-05-10 RX ADMIN — LEVOTHYROXINE SODIUM 50 MCG: 0.05 TABLET ORAL at 05:05

## 2023-05-10 RX ADMIN — GUAIFENESIN 600 MG: 600 TABLET ORAL at 09:00

## 2023-05-10 RX ADMIN — ALPRAZOLAM 0.25 MG: 0.25 TABLET ORAL at 19:36

## 2023-05-10 RX ADMIN — Medication 1 TABLET: at 15:49

## 2023-05-10 RX ADMIN — Medication 2000 UNITS: at 09:00

## 2023-05-10 RX ADMIN — HYDRALAZINE HYDROCHLORIDE 10 MG: 10 TABLET, FILM COATED ORAL at 16:49

## 2023-05-10 RX ADMIN — LISINOPRIL 10 MG: 10 TABLET ORAL at 09:01

## 2023-05-10 RX ADMIN — PRAVASTATIN SODIUM 80 MG: 40 TABLET ORAL at 09:01

## 2023-05-10 RX ADMIN — GUAIFENESIN 600 MG: 600 TABLET ORAL at 19:36

## 2023-05-10 RX ADMIN — CARVEDILOL 12.5 MG: 12.5 TABLET, FILM COATED ORAL at 16:49

## 2023-05-10 RX ADMIN — ASPIRIN 81 MG: 81 TABLET, COATED ORAL at 09:00

## 2023-05-10 RX ADMIN — CETIRIZINE HYDROCHLORIDE 5 MG: 10 TABLET, FILM COATED ORAL at 09:01

## 2023-05-10 RX ADMIN — ACETAMINOPHEN 650 MG: 325 TABLET ORAL at 11:08

## 2023-05-10 RX ADMIN — SENNOSIDES AND DOCUSATE SODIUM 1 TABLET: 50; 8.6 TABLET ORAL at 09:00

## 2023-05-10 RX ADMIN — FLUTICASONE PROPIONATE 1 SPRAY: 50 SPRAY, METERED NASAL at 09:13

## 2023-05-10 RX ADMIN — ENOXAPARIN SODIUM 30 MG: 100 INJECTION SUBCUTANEOUS at 09:00

## 2023-05-10 RX ADMIN — GABAPENTIN 100 MG: 100 CAPSULE ORAL at 15:49

## 2023-05-10 ASSESSMENT — PAIN SCALES - GENERAL
PAINLEVEL_OUTOF10: 0
PAINLEVEL_OUTOF10: 2

## 2023-05-10 ASSESSMENT — PAIN DESCRIPTION - LOCATION: LOCATION: HEAD

## 2023-05-10 ASSESSMENT — PAIN DESCRIPTION - DESCRIPTORS: DESCRIPTORS: ACHING

## 2023-05-10 ASSESSMENT — PAIN DESCRIPTION - ORIENTATION: ORIENTATION: RIGHT

## 2023-05-11 LAB
ANION GAP SERPL CALCULATED.3IONS-SCNC: 8 MMOL/L (ref 3–16)
BASOPHILS # BLD: 0 K/UL (ref 0–0.2)
BASOPHILS NFR BLD: 1.1 %
BUN SERPL-MCNC: 11 MG/DL (ref 7–20)
CALCIUM SERPL-MCNC: 8.8 MG/DL (ref 8.3–10.6)
CHLORIDE SERPL-SCNC: 102 MMOL/L (ref 99–110)
CO2 SERPL-SCNC: 24 MMOL/L (ref 21–32)
CREAT SERPL-MCNC: 0.6 MG/DL (ref 0.6–1.2)
DEPRECATED RDW RBC AUTO: 15.3 % (ref 12.4–15.4)
EOSINOPHIL # BLD: 0.2 K/UL (ref 0–0.6)
EOSINOPHIL NFR BLD: 4.9 %
GFR SERPLBLD CREATININE-BSD FMLA CKD-EPI: >60 ML/MIN/{1.73_M2}
GLUCOSE SERPL-MCNC: 93 MG/DL (ref 70–99)
HCT VFR BLD AUTO: 34 % (ref 36–48)
HGB BLD-MCNC: 11.1 G/DL (ref 12–16)
LYMPHOCYTES # BLD: 1.4 K/UL (ref 1–5.1)
LYMPHOCYTES NFR BLD: 33.2 %
MCH RBC QN AUTO: 26.9 PG (ref 26–34)
MCHC RBC AUTO-ENTMCNC: 32.8 G/DL (ref 31–36)
MCV RBC AUTO: 82.1 FL (ref 80–100)
MONOCYTES # BLD: 0.3 K/UL (ref 0–1.3)
MONOCYTES NFR BLD: 7.4 %
NEUTROPHILS # BLD: 2.2 K/UL (ref 1.7–7.7)
NEUTROPHILS NFR BLD: 53.4 %
PLATELET # BLD AUTO: 191 K/UL (ref 135–450)
PMV BLD AUTO: 8.8 FL (ref 5–10.5)
POTASSIUM SERPL-SCNC: 3.6 MMOL/L (ref 3.5–5.1)
RBC # BLD AUTO: 4.13 M/UL (ref 4–5.2)
SODIUM SERPL-SCNC: 134 MMOL/L (ref 136–145)
WBC # BLD AUTO: 4.1 K/UL (ref 4–11)

## 2023-05-11 PROCEDURE — 6360000002 HC RX W HCPCS: Performed by: PHYSICAL MEDICINE & REHABILITATION

## 2023-05-11 PROCEDURE — 97129 THER IVNTJ 1ST 15 MIN: CPT

## 2023-05-11 PROCEDURE — 36415 COLL VENOUS BLD VENIPUNCTURE: CPT

## 2023-05-11 PROCEDURE — 1280000000 HC REHAB R&B

## 2023-05-11 PROCEDURE — 85025 COMPLETE CBC W/AUTO DIFF WBC: CPT

## 2023-05-11 PROCEDURE — 97112 NEUROMUSCULAR REEDUCATION: CPT

## 2023-05-11 PROCEDURE — 6370000000 HC RX 637 (ALT 250 FOR IP): Performed by: PHYSICAL MEDICINE & REHABILITATION

## 2023-05-11 PROCEDURE — 80048 BASIC METABOLIC PNL TOTAL CA: CPT

## 2023-05-11 PROCEDURE — 97530 THERAPEUTIC ACTIVITIES: CPT | Performed by: PHYSICAL THERAPIST

## 2023-05-11 PROCEDURE — 97130 THER IVNTJ EA ADDL 15 MIN: CPT

## 2023-05-11 PROCEDURE — 97110 THERAPEUTIC EXERCISES: CPT | Performed by: PHYSICAL THERAPIST

## 2023-05-11 PROCEDURE — 97530 THERAPEUTIC ACTIVITIES: CPT

## 2023-05-11 PROCEDURE — 97116 GAIT TRAINING THERAPY: CPT | Performed by: PHYSICAL THERAPIST

## 2023-05-11 PROCEDURE — 92507 TX SP LANG VOICE COMM INDIV: CPT

## 2023-05-11 RX ORDER — LISINOPRIL 20 MG/1
20 TABLET ORAL DAILY
Status: DISCONTINUED | OUTPATIENT
Start: 2023-05-12 | End: 2023-05-12

## 2023-05-11 RX ORDER — LISINOPRIL 10 MG/1
10 TABLET ORAL ONCE
Status: COMPLETED | OUTPATIENT
Start: 2023-05-11 | End: 2023-05-11

## 2023-05-11 RX ADMIN — Medication 1 TABLET: at 08:13

## 2023-05-11 RX ADMIN — GUAIFENESIN 600 MG: 600 TABLET ORAL at 21:17

## 2023-05-11 RX ADMIN — ALPRAZOLAM 0.25 MG: 0.25 TABLET ORAL at 20:27

## 2023-05-11 RX ADMIN — FLUTICASONE PROPIONATE 1 SPRAY: 50 SPRAY, METERED NASAL at 21:16

## 2023-05-11 RX ADMIN — GUAIFENESIN 600 MG: 600 TABLET ORAL at 08:29

## 2023-05-11 RX ADMIN — ENOXAPARIN SODIUM 30 MG: 100 INJECTION SUBCUTANEOUS at 08:11

## 2023-05-11 RX ADMIN — ACETAMINOPHEN 650 MG: 325 TABLET ORAL at 12:48

## 2023-05-11 RX ADMIN — SENNOSIDES AND DOCUSATE SODIUM 1 TABLET: 50; 8.6 TABLET ORAL at 08:12

## 2023-05-11 RX ADMIN — LISINOPRIL 10 MG: 10 TABLET ORAL at 11:26

## 2023-05-11 RX ADMIN — ASPIRIN 81 MG: 81 TABLET, COATED ORAL at 08:12

## 2023-05-11 RX ADMIN — CETIRIZINE HYDROCHLORIDE 5 MG: 10 TABLET, FILM COATED ORAL at 08:12

## 2023-05-11 RX ADMIN — GABAPENTIN 100 MG: 100 CAPSULE ORAL at 12:48

## 2023-05-11 RX ADMIN — LEVOTHYROXINE SODIUM 50 MCG: 0.05 TABLET ORAL at 05:10

## 2023-05-11 RX ADMIN — FLUTICASONE PROPIONATE 1 SPRAY: 50 SPRAY, METERED NASAL at 08:15

## 2023-05-11 RX ADMIN — POLYETHYLENE GLYCOL 3350 17 G: 17 POWDER, FOR SOLUTION ORAL at 11:22

## 2023-05-11 RX ADMIN — PRAVASTATIN SODIUM 80 MG: 40 TABLET ORAL at 08:12

## 2023-05-11 RX ADMIN — LISINOPRIL 10 MG: 10 TABLET ORAL at 08:12

## 2023-05-11 RX ADMIN — GABAPENTIN 100 MG: 100 CAPSULE ORAL at 21:17

## 2023-05-11 RX ADMIN — CARVEDILOL 12.5 MG: 12.5 TABLET, FILM COATED ORAL at 08:12

## 2023-05-11 RX ADMIN — SENNOSIDES AND DOCUSATE SODIUM 1 TABLET: 50; 8.6 TABLET ORAL at 21:17

## 2023-05-11 RX ADMIN — GABAPENTIN 100 MG: 100 CAPSULE ORAL at 08:12

## 2023-05-11 RX ADMIN — Medication 2000 UNITS: at 08:13

## 2023-05-11 RX ADMIN — CARVEDILOL 12.5 MG: 12.5 TABLET, FILM COATED ORAL at 17:20

## 2023-05-11 ASSESSMENT — PAIN SCALES - GENERAL
PAINLEVEL_OUTOF10: 0
PAINLEVEL_OUTOF10: 0
PAINLEVEL_OUTOF10: 3

## 2023-05-11 ASSESSMENT — PAIN DESCRIPTION - ONSET: ONSET: ON-GOING

## 2023-05-11 ASSESSMENT — PAIN - FUNCTIONAL ASSESSMENT: PAIN_FUNCTIONAL_ASSESSMENT: ACTIVITIES ARE NOT PREVENTED

## 2023-05-11 ASSESSMENT — PAIN DESCRIPTION - FREQUENCY: FREQUENCY: CONTINUOUS

## 2023-05-11 ASSESSMENT — PAIN DESCRIPTION - LOCATION: LOCATION: GENERALIZED

## 2023-05-11 ASSESSMENT — PAIN DESCRIPTION - DESCRIPTORS: DESCRIPTORS: ACHING

## 2023-05-11 ASSESSMENT — PAIN DESCRIPTION - PAIN TYPE: TYPE: ACUTE PAIN

## 2023-05-11 ASSESSMENT — PAIN DESCRIPTION - ORIENTATION: ORIENTATION: RIGHT;LEFT

## 2023-05-11 NOTE — CARE COORDINATION
Team Conference held today. Team reviewed barriers:    lives alone, right sided weakness, fatigue, decreased memory, anxiety, previous fall, limited support system. Team recommends continued stay on rehab with DC planned for Thursday, 5- to home with home care orders for sn/pt/ot. DME recs:   TTB, walker basket or tray, TSF. They are requesting family education prior to discharge. Met with patient to review. She is very anxious and requests that I speak with her friend, Brando Araiza that she gets on the phone. Introduced  role; gave update. She reports she herself is leaving for out of town on Thursday but she thinks another friend, Aditya Garcia, will be able to take her home. Reviewed Home care recommendations. Bela requests that I reach out to pt's sister to review. Pt is okay with this. While in the room, Speaker on and called to sister, Azael Castillo to review Conference, DME, DC date. Azael Castillo reports she will be on her own, Olinda Molina will be out of town and she will need to take care of herself as she is not coming into town to be with her and she cannot go to where she lives. Suggestion made for Referral to [place] [address] for Emergency response button, [address] for laundry and homemaking tasks and shopping and medical transportation. They were both in agreement of this. They are both in agreement of Home Health services to continue her progress in personal care and ambulation needs in home setting. Provided a  CMS star rated list of agencies for their review. Education given regarding cms star rating system. Azael Castillo requests that I call Brando Araiza to review the list as she does not want to do this over the phone and does not want me to email to her. Reviewed DME:  TTB, TSF and walker basket or tray. She recommends I get these items from [place]. Azael Castillo reports the Elevator in the building is now working again but it does go out from time to time.   Encouraged discussion about long term plan has

## 2023-05-12 PROCEDURE — 1280000000 HC REHAB R&B

## 2023-05-12 PROCEDURE — 97535 SELF CARE MNGMENT TRAINING: CPT

## 2023-05-12 PROCEDURE — 92507 TX SP LANG VOICE COMM INDIV: CPT

## 2023-05-12 PROCEDURE — 6360000002 HC RX W HCPCS: Performed by: PHYSICAL MEDICINE & REHABILITATION

## 2023-05-12 PROCEDURE — 97130 THER IVNTJ EA ADDL 15 MIN: CPT

## 2023-05-12 PROCEDURE — 97112 NEUROMUSCULAR REEDUCATION: CPT

## 2023-05-12 PROCEDURE — 97129 THER IVNTJ 1ST 15 MIN: CPT

## 2023-05-12 PROCEDURE — 97110 THERAPEUTIC EXERCISES: CPT

## 2023-05-12 PROCEDURE — 97530 THERAPEUTIC ACTIVITIES: CPT

## 2023-05-12 PROCEDURE — 6370000000 HC RX 637 (ALT 250 FOR IP): Performed by: PHYSICAL MEDICINE & REHABILITATION

## 2023-05-12 PROCEDURE — 94760 N-INVAS EAR/PLS OXIMETRY 1: CPT

## 2023-05-12 PROCEDURE — 97116 GAIT TRAINING THERAPY: CPT

## 2023-05-12 RX ORDER — LISINOPRIL 20 MG/1
20 TABLET ORAL 2 TIMES DAILY
Status: DISCONTINUED | OUTPATIENT
Start: 2023-05-12 | End: 2023-05-18 | Stop reason: HOSPADM

## 2023-05-12 RX ORDER — HYDRALAZINE HYDROCHLORIDE 25 MG/1
25 TABLET, FILM COATED ORAL EVERY 4 HOURS PRN
Status: DISCONTINUED | OUTPATIENT
Start: 2023-05-12 | End: 2023-05-18 | Stop reason: HOSPADM

## 2023-05-12 RX ADMIN — CETIRIZINE HYDROCHLORIDE 5 MG: 10 TABLET, FILM COATED ORAL at 07:38

## 2023-05-12 RX ADMIN — PRAVASTATIN SODIUM 80 MG: 40 TABLET ORAL at 07:54

## 2023-05-12 RX ADMIN — LISINOPRIL 20 MG: 20 TABLET ORAL at 07:38

## 2023-05-12 RX ADMIN — Medication 1 TABLET: at 07:38

## 2023-05-12 RX ADMIN — ASPIRIN 81 MG: 81 TABLET, COATED ORAL at 07:38

## 2023-05-12 RX ADMIN — GABAPENTIN 100 MG: 100 CAPSULE ORAL at 13:58

## 2023-05-12 RX ADMIN — LISINOPRIL 20 MG: 20 TABLET ORAL at 21:24

## 2023-05-12 RX ADMIN — SENNOSIDES AND DOCUSATE SODIUM 1 TABLET: 50; 8.6 TABLET ORAL at 07:39

## 2023-05-12 RX ADMIN — MAGNESIUM HYDROXIDE 30 ML: 400 SUSPENSION ORAL at 07:36

## 2023-05-12 RX ADMIN — ENOXAPARIN SODIUM 30 MG: 100 INJECTION SUBCUTANEOUS at 07:38

## 2023-05-12 RX ADMIN — POLYETHYLENE GLYCOL 3350 17 G: 17 POWDER, FOR SOLUTION ORAL at 07:36

## 2023-05-12 RX ADMIN — FLUTICASONE PROPIONATE 1 SPRAY: 50 SPRAY, METERED NASAL at 21:24

## 2023-05-12 RX ADMIN — CARVEDILOL 12.5 MG: 12.5 TABLET, FILM COATED ORAL at 16:34

## 2023-05-12 RX ADMIN — GUAIFENESIN 600 MG: 600 TABLET ORAL at 07:39

## 2023-05-12 RX ADMIN — LEVOTHYROXINE SODIUM 50 MCG: 0.05 TABLET ORAL at 05:06

## 2023-05-12 RX ADMIN — GABAPENTIN 100 MG: 100 CAPSULE ORAL at 21:24

## 2023-05-12 RX ADMIN — SENNOSIDES AND DOCUSATE SODIUM 1 TABLET: 50; 8.6 TABLET ORAL at 21:24

## 2023-05-12 RX ADMIN — CARVEDILOL 12.5 MG: 12.5 TABLET, FILM COATED ORAL at 07:38

## 2023-05-12 RX ADMIN — Medication 2000 UNITS: at 07:39

## 2023-05-12 RX ADMIN — ALPRAZOLAM 0.25 MG: 0.25 TABLET ORAL at 21:24

## 2023-05-12 RX ADMIN — GABAPENTIN 100 MG: 100 CAPSULE ORAL at 07:38

## 2023-05-12 RX ADMIN — FLUTICASONE PROPIONATE 1 SPRAY: 50 SPRAY, METERED NASAL at 07:39

## 2023-05-12 RX ADMIN — HYDRALAZINE HYDROCHLORIDE 25 MG: 25 TABLET, FILM COATED ORAL at 16:34

## 2023-05-12 RX ADMIN — GUAIFENESIN 600 MG: 600 TABLET ORAL at 21:24

## 2023-05-12 RX ADMIN — HYDRALAZINE HYDROCHLORIDE 10 MG: 10 TABLET, FILM COATED ORAL at 04:30

## 2023-05-12 NOTE — FLOWSHEET NOTE
Patient admitted to ARU with Dx of stroke. A/Ox4  forgetful at times. Transfers with walker x1 assist with gait belt. Mobility restrictions: WBAT. On Regular diet, tolerating well. Medications taken whole with thin liquids. On Lovenox for DVT prophylaxis. 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 Oxygen: RA. LDA: PIV, LFA. Has been continent of bladder. LBM 5/6/2023, senokot and Miralax administered today. Chair/bed alarms in use, LOUIS cam in room d/t decreased safety awareness and fall risk. Call light within reach. Will continue to monitor for safety.

## 2023-05-12 NOTE — CARE COORDINATION
Spoke with Erich Flores about Home care services. Reviewed for her list of agenies left in the room for her review. Provided her with CMS star rated list and emailed it to her at:   [email or web address]  Reviewed for her star rating system.   She can come in for [facility] Education on Monday 5- at [address] [place] [address]     Case Management   [phone]    5/12/2023  11:33 AM

## 2023-05-12 NOTE — CARE COORDINATION
Esther Moss states she does have the wh walker at home.   [place], [place]     Case Management   [phone]    5/12/2023  2:04 PM

## 2023-05-13 PROCEDURE — 1280000000 HC REHAB R&B

## 2023-05-13 PROCEDURE — 6370000000 HC RX 637 (ALT 250 FOR IP): Performed by: PHYSICAL MEDICINE & REHABILITATION

## 2023-05-13 PROCEDURE — 94760 N-INVAS EAR/PLS OXIMETRY 1: CPT

## 2023-05-13 PROCEDURE — 6360000002 HC RX W HCPCS: Performed by: PHYSICAL MEDICINE & REHABILITATION

## 2023-05-13 RX ADMIN — GUAIFENESIN 600 MG: 600 TABLET ORAL at 08:28

## 2023-05-13 RX ADMIN — FLUTICASONE PROPIONATE 1 SPRAY: 50 SPRAY, METERED NASAL at 20:38

## 2023-05-13 RX ADMIN — CETIRIZINE HYDROCHLORIDE 5 MG: 10 TABLET, FILM COATED ORAL at 08:28

## 2023-05-13 RX ADMIN — SENNOSIDES AND DOCUSATE SODIUM 1 TABLET: 50; 8.6 TABLET ORAL at 08:27

## 2023-05-13 RX ADMIN — GABAPENTIN 100 MG: 100 CAPSULE ORAL at 08:28

## 2023-05-13 RX ADMIN — LISINOPRIL 20 MG: 20 TABLET ORAL at 20:36

## 2023-05-13 RX ADMIN — GUAIFENESIN 600 MG: 600 TABLET ORAL at 20:31

## 2023-05-13 RX ADMIN — GABAPENTIN 100 MG: 100 CAPSULE ORAL at 13:18

## 2023-05-13 RX ADMIN — LISINOPRIL 20 MG: 20 TABLET ORAL at 08:28

## 2023-05-13 RX ADMIN — ALPRAZOLAM 0.25 MG: 0.25 TABLET ORAL at 20:36

## 2023-05-13 RX ADMIN — HYDRALAZINE HYDROCHLORIDE 25 MG: 25 TABLET, FILM COATED ORAL at 22:12

## 2023-05-13 RX ADMIN — ASPIRIN 81 MG: 81 TABLET, COATED ORAL at 08:27

## 2023-05-13 RX ADMIN — PRAVASTATIN SODIUM 80 MG: 40 TABLET ORAL at 08:28

## 2023-05-13 RX ADMIN — CARVEDILOL 12.5 MG: 12.5 TABLET, FILM COATED ORAL at 08:27

## 2023-05-13 RX ADMIN — Medication 1 TABLET: at 08:28

## 2023-05-13 RX ADMIN — Medication 3 MG: at 20:31

## 2023-05-13 RX ADMIN — ENOXAPARIN SODIUM 30 MG: 100 INJECTION SUBCUTANEOUS at 08:28

## 2023-05-13 RX ADMIN — LEVOTHYROXINE SODIUM 50 MCG: 0.05 TABLET ORAL at 06:27

## 2023-05-13 RX ADMIN — GABAPENTIN 100 MG: 100 CAPSULE ORAL at 20:31

## 2023-05-13 RX ADMIN — SENNOSIDES AND DOCUSATE SODIUM 1 TABLET: 50; 8.6 TABLET ORAL at 20:31

## 2023-05-13 RX ADMIN — Medication 2000 UNITS: at 08:27

## 2023-05-13 RX ADMIN — FLUTICASONE PROPIONATE 1 SPRAY: 50 SPRAY, METERED NASAL at 08:33

## 2023-05-13 RX ADMIN — CARVEDILOL 12.5 MG: 12.5 TABLET, FILM COATED ORAL at 17:32

## 2023-05-14 VITALS
RESPIRATION RATE: 16 BRPM | BODY MASS INDEX: 19.94 KG/M2 | SYSTOLIC BLOOD PRESSURE: 164 MMHG | HEIGHT: 65 IN | TEMPERATURE: 97.7 F | DIASTOLIC BLOOD PRESSURE: 72 MMHG | OXYGEN SATURATION: 97 % | WEIGHT: 119.71 LBS | HEART RATE: 65 BPM

## 2023-05-14 PROCEDURE — 1280000000 HC REHAB R&B

## 2023-05-14 PROCEDURE — 94760 N-INVAS EAR/PLS OXIMETRY 1: CPT

## 2023-05-14 PROCEDURE — 6360000002 HC RX W HCPCS: Performed by: PHYSICAL MEDICINE & REHABILITATION

## 2023-05-14 PROCEDURE — 6370000000 HC RX 637 (ALT 250 FOR IP): Performed by: PHYSICAL MEDICINE & REHABILITATION

## 2023-05-14 RX ADMIN — GABAPENTIN 100 MG: 100 CAPSULE ORAL at 13:42

## 2023-05-14 RX ADMIN — Medication 1 TABLET: at 09:24

## 2023-05-14 RX ADMIN — ASPIRIN 81 MG: 81 TABLET, COATED ORAL at 09:24

## 2023-05-14 RX ADMIN — CETIRIZINE HYDROCHLORIDE 5 MG: 10 TABLET, FILM COATED ORAL at 09:24

## 2023-05-14 RX ADMIN — FLUTICASONE PROPIONATE 1 SPRAY: 50 SPRAY, METERED NASAL at 21:03

## 2023-05-14 RX ADMIN — GABAPENTIN 100 MG: 100 CAPSULE ORAL at 21:02

## 2023-05-14 RX ADMIN — ALPRAZOLAM 0.25 MG: 0.25 TABLET ORAL at 21:02

## 2023-05-14 RX ADMIN — HYDRALAZINE HYDROCHLORIDE 25 MG: 25 TABLET, FILM COATED ORAL at 05:14

## 2023-05-14 RX ADMIN — LEVOTHYROXINE SODIUM 50 MCG: 0.05 TABLET ORAL at 05:14

## 2023-05-14 RX ADMIN — Medication 2000 UNITS: at 09:24

## 2023-05-14 RX ADMIN — SENNOSIDES AND DOCUSATE SODIUM 1 TABLET: 50; 8.6 TABLET ORAL at 21:02

## 2023-05-14 RX ADMIN — GABAPENTIN 100 MG: 100 CAPSULE ORAL at 09:24

## 2023-05-14 RX ADMIN — LISINOPRIL 20 MG: 20 TABLET ORAL at 09:24

## 2023-05-14 RX ADMIN — SENNOSIDES AND DOCUSATE SODIUM 1 TABLET: 50; 8.6 TABLET ORAL at 09:24

## 2023-05-14 RX ADMIN — PRAVASTATIN SODIUM 80 MG: 40 TABLET ORAL at 09:24

## 2023-05-14 RX ADMIN — LISINOPRIL 20 MG: 20 TABLET ORAL at 21:02

## 2023-05-14 RX ADMIN — ENOXAPARIN SODIUM 30 MG: 100 INJECTION SUBCUTANEOUS at 09:24

## 2023-05-14 RX ADMIN — CARVEDILOL 12.5 MG: 12.5 TABLET, FILM COATED ORAL at 09:25

## 2023-05-14 RX ADMIN — GUAIFENESIN 600 MG: 600 TABLET ORAL at 09:24

## 2023-05-14 RX ADMIN — GUAIFENESIN 600 MG: 600 TABLET ORAL at 21:02

## 2023-05-14 RX ADMIN — CARVEDILOL 12.5 MG: 12.5 TABLET, FILM COATED ORAL at 16:56

## 2023-05-14 RX ADMIN — FLUTICASONE PROPIONATE 1 SPRAY: 50 SPRAY, METERED NASAL at 09:26

## 2023-05-15 LAB
ANION GAP SERPL CALCULATED.3IONS-SCNC: 10 MMOL/L (ref 3–16)
BASOPHILS # BLD: 0.1 K/UL (ref 0–0.2)
BASOPHILS NFR BLD: 1 %
BILIRUB UR QL STRIP.AUTO: NEGATIVE
BUN SERPL-MCNC: 10 MG/DL (ref 7–20)
CALCIUM SERPL-MCNC: 9.3 MG/DL (ref 8.3–10.6)
CHLORIDE SERPL-SCNC: 103 MMOL/L (ref 99–110)
CLARITY UR: CLEAR
CO2 SERPL-SCNC: 23 MMOL/L (ref 21–32)
COLOR UR: YELLOW
CREAT SERPL-MCNC: 0.8 MG/DL (ref 0.6–1.2)
DEPRECATED RDW RBC AUTO: 15.9 % (ref 12.4–15.4)
EOSINOPHIL # BLD: 0.3 K/UL (ref 0–0.6)
EOSINOPHIL NFR BLD: 5.4 %
GFR SERPLBLD CREATININE-BSD FMLA CKD-EPI: >60 ML/MIN/{1.73_M2}
GLUCOSE SERPL-MCNC: 88 MG/DL (ref 70–99)
GLUCOSE UR STRIP.AUTO-MCNC: NEGATIVE MG/DL
HCT VFR BLD AUTO: 35.6 % (ref 36–48)
HGB BLD-MCNC: 11.7 G/DL (ref 12–16)
HGB UR QL STRIP.AUTO: NEGATIVE
KETONES UR STRIP.AUTO-MCNC: NEGATIVE MG/DL
LEUKOCYTE ESTERASE UR QL STRIP.AUTO: NEGATIVE
LYMPHOCYTES # BLD: 1.3 K/UL (ref 1–5.1)
LYMPHOCYTES NFR BLD: 25.8 %
MCH RBC QN AUTO: 27.3 PG (ref 26–34)
MCHC RBC AUTO-ENTMCNC: 32.9 G/DL (ref 31–36)
MCV RBC AUTO: 83.1 FL (ref 80–100)
MONOCYTES # BLD: 0.4 K/UL (ref 0–1.3)
MONOCYTES NFR BLD: 6.9 %
NEUTROPHILS # BLD: 3.2 K/UL (ref 1.7–7.7)
NEUTROPHILS NFR BLD: 60.9 %
NITRITE UR QL STRIP.AUTO: NEGATIVE
PH UR STRIP.AUTO: 5.5 [PH] (ref 5–8)
PLATELET # BLD AUTO: 240 K/UL (ref 135–450)
PMV BLD AUTO: 8.5 FL (ref 5–10.5)
POTASSIUM SERPL-SCNC: 3.9 MMOL/L (ref 3.5–5.1)
PROT UR STRIP.AUTO-MCNC: NEGATIVE MG/DL
RBC # BLD AUTO: 4.29 M/UL (ref 4–5.2)
SODIUM SERPL-SCNC: 136 MMOL/L (ref 136–145)
SP GR UR STRIP.AUTO: 1.01 (ref 1–1.03)
UA COMPLETE W REFLEX CULTURE PNL UR: NORMAL
UA DIPSTICK W REFLEX MICRO PNL UR: NORMAL
URN SPEC COLLECT METH UR: NORMAL
UROBILINOGEN UR STRIP-ACNC: 0.2 E.U./DL
WBC # BLD AUTO: 5.2 K/UL (ref 4–11)

## 2023-05-15 PROCEDURE — 6360000002 HC RX W HCPCS: Performed by: PHYSICAL MEDICINE & REHABILITATION

## 2023-05-15 PROCEDURE — 94760 N-INVAS EAR/PLS OXIMETRY 1: CPT

## 2023-05-15 PROCEDURE — 81003 URINALYSIS AUTO W/O SCOPE: CPT

## 2023-05-15 PROCEDURE — 6370000000 HC RX 637 (ALT 250 FOR IP): Performed by: PHYSICAL MEDICINE & REHABILITATION

## 2023-05-15 PROCEDURE — 97116 GAIT TRAINING THERAPY: CPT

## 2023-05-15 PROCEDURE — 36415 COLL VENOUS BLD VENIPUNCTURE: CPT

## 2023-05-15 PROCEDURE — 85025 COMPLETE CBC W/AUTO DIFF WBC: CPT

## 2023-05-15 PROCEDURE — 1280000000 HC REHAB R&B

## 2023-05-15 PROCEDURE — 97535 SELF CARE MNGMENT TRAINING: CPT

## 2023-05-15 PROCEDURE — 97110 THERAPEUTIC EXERCISES: CPT

## 2023-05-15 PROCEDURE — 97530 THERAPEUTIC ACTIVITIES: CPT

## 2023-05-15 PROCEDURE — 97129 THER IVNTJ 1ST 15 MIN: CPT

## 2023-05-15 PROCEDURE — 80048 BASIC METABOLIC PNL TOTAL CA: CPT

## 2023-05-15 RX ORDER — CETIRIZINE HYDROCHLORIDE 10 MG/1
10 TABLET ORAL DAILY
Status: DISCONTINUED | OUTPATIENT
Start: 2023-05-16 | End: 2023-05-18 | Stop reason: HOSPADM

## 2023-05-15 RX ORDER — CITALOPRAM 10 MG/1
10 TABLET ORAL DAILY
Status: DISCONTINUED | OUTPATIENT
Start: 2023-05-16 | End: 2023-05-18 | Stop reason: HOSPADM

## 2023-05-15 RX ORDER — HYDROCHLOROTHIAZIDE 25 MG/1
25 TABLET ORAL DAILY
Status: DISCONTINUED | OUTPATIENT
Start: 2023-05-15 | End: 2023-05-16

## 2023-05-15 RX ADMIN — GABAPENTIN 100 MG: 100 CAPSULE ORAL at 13:12

## 2023-05-15 RX ADMIN — GUAIFENESIN 600 MG: 600 TABLET ORAL at 07:39

## 2023-05-15 RX ADMIN — LISINOPRIL 20 MG: 20 TABLET ORAL at 07:41

## 2023-05-15 RX ADMIN — HYDROCHLOROTHIAZIDE 25 MG: 25 TABLET ORAL at 11:42

## 2023-05-15 RX ADMIN — CARVEDILOL 12.5 MG: 12.5 TABLET, FILM COATED ORAL at 17:00

## 2023-05-15 RX ADMIN — ALPRAZOLAM 0.25 MG: 0.25 TABLET ORAL at 21:21

## 2023-05-15 RX ADMIN — Medication 2000 UNITS: at 07:39

## 2023-05-15 RX ADMIN — GUAIFENESIN 600 MG: 600 TABLET ORAL at 21:20

## 2023-05-15 RX ADMIN — CETIRIZINE HYDROCHLORIDE 5 MG: 10 TABLET, FILM COATED ORAL at 07:40

## 2023-05-15 RX ADMIN — ASPIRIN 81 MG: 81 TABLET, COATED ORAL at 07:39

## 2023-05-15 RX ADMIN — SENNOSIDES AND DOCUSATE SODIUM 1 TABLET: 50; 8.6 TABLET ORAL at 21:20

## 2023-05-15 RX ADMIN — FLUTICASONE PROPIONATE 1 SPRAY: 50 SPRAY, METERED NASAL at 07:51

## 2023-05-15 RX ADMIN — CARVEDILOL 12.5 MG: 12.5 TABLET, FILM COATED ORAL at 07:40

## 2023-05-15 RX ADMIN — PRAVASTATIN SODIUM 80 MG: 40 TABLET ORAL at 07:39

## 2023-05-15 RX ADMIN — FLUTICASONE PROPIONATE 1 SPRAY: 50 SPRAY, METERED NASAL at 21:22

## 2023-05-15 RX ADMIN — SENNOSIDES AND DOCUSATE SODIUM 1 TABLET: 50; 8.6 TABLET ORAL at 07:39

## 2023-05-15 RX ADMIN — ENOXAPARIN SODIUM 30 MG: 100 INJECTION SUBCUTANEOUS at 07:39

## 2023-05-15 RX ADMIN — GABAPENTIN 100 MG: 100 CAPSULE ORAL at 07:40

## 2023-05-15 RX ADMIN — LEVOTHYROXINE SODIUM 50 MCG: 0.05 TABLET ORAL at 06:29

## 2023-05-15 RX ADMIN — LISINOPRIL 20 MG: 20 TABLET ORAL at 21:20

## 2023-05-15 RX ADMIN — Medication 1 TABLET: at 07:41

## 2023-05-15 ASSESSMENT — PAIN SCALES - GENERAL
PAINLEVEL_OUTOF10: 0

## 2023-05-15 NOTE — FLOWSHEET NOTE
Patient was admitted to ARU on 5/5 with Dx of Acute ischemic stroke. Patient is alert and oriented x4. HOB up, coughs but nonproductive. On mucinex. Given prn xanax d/t anxiety at hs. Goes to the BR every 2 hours due to urgency. On Regular Diet. Medications taken whole with thin liquids, tolerating well. Receives Lovenox for DVT prophylaxis . Patient has been continent of bowel and bladder. LBM  5/12. Denies she is constipated and that she doesn't have BM daily. Give prn laxatives in am. 5/15. Skin: abrasion to right knee. Transfers using Gait belt and walker  x 1 assist. Needs to be contact guard d/t unsteadiness, tends to stagger to the left. She needs to position right hand placement on dycem for her walker handle. Safety precautions remains in place including AVASYS. Call light within reach,bed alarm activated,will continue to monitor for safety and comfort. Rigo Fowler

## 2023-05-16 PROCEDURE — 97530 THERAPEUTIC ACTIVITIES: CPT

## 2023-05-16 PROCEDURE — 97535 SELF CARE MNGMENT TRAINING: CPT

## 2023-05-16 PROCEDURE — 97129 THER IVNTJ 1ST 15 MIN: CPT

## 2023-05-16 PROCEDURE — 1280000000 HC REHAB R&B

## 2023-05-16 PROCEDURE — 97116 GAIT TRAINING THERAPY: CPT

## 2023-05-16 PROCEDURE — 6360000002 HC RX W HCPCS: Performed by: PHYSICAL MEDICINE & REHABILITATION

## 2023-05-16 PROCEDURE — 92507 TX SP LANG VOICE COMM INDIV: CPT

## 2023-05-16 PROCEDURE — 94760 N-INVAS EAR/PLS OXIMETRY 1: CPT

## 2023-05-16 PROCEDURE — 97112 NEUROMUSCULAR REEDUCATION: CPT

## 2023-05-16 PROCEDURE — 6370000000 HC RX 637 (ALT 250 FOR IP): Performed by: PHYSICAL MEDICINE & REHABILITATION

## 2023-05-16 PROCEDURE — 97130 THER IVNTJ EA ADDL 15 MIN: CPT

## 2023-05-16 PROCEDURE — 97110 THERAPEUTIC EXERCISES: CPT

## 2023-05-16 RX ORDER — HYDROCHLOROTHIAZIDE 25 MG/1
50 TABLET ORAL DAILY
Status: DISCONTINUED | OUTPATIENT
Start: 2023-05-17 | End: 2023-05-18 | Stop reason: HOSPADM

## 2023-05-16 RX ADMIN — CARVEDILOL 12.5 MG: 12.5 TABLET, FILM COATED ORAL at 07:17

## 2023-05-16 RX ADMIN — ALPRAZOLAM 0.25 MG: 0.25 TABLET ORAL at 22:18

## 2023-05-16 RX ADMIN — GUAIFENESIN 600 MG: 600 TABLET ORAL at 07:16

## 2023-05-16 RX ADMIN — GUAIFENESIN 600 MG: 600 TABLET ORAL at 22:18

## 2023-05-16 RX ADMIN — ENOXAPARIN SODIUM 30 MG: 100 INJECTION SUBCUTANEOUS at 07:17

## 2023-05-16 RX ADMIN — CETIRIZINE HYDROCHLORIDE 10 MG: 10 TABLET, FILM COATED ORAL at 07:16

## 2023-05-16 RX ADMIN — ASPIRIN 81 MG: 81 TABLET, COATED ORAL at 07:17

## 2023-05-16 RX ADMIN — FLUTICASONE PROPIONATE 1 SPRAY: 50 SPRAY, METERED NASAL at 07:18

## 2023-05-16 RX ADMIN — SENNOSIDES AND DOCUSATE SODIUM 1 TABLET: 50; 8.6 TABLET ORAL at 22:18

## 2023-05-16 RX ADMIN — SENNOSIDES AND DOCUSATE SODIUM 1 TABLET: 50; 8.6 TABLET ORAL at 07:17

## 2023-05-16 RX ADMIN — PRAVASTATIN SODIUM 80 MG: 40 TABLET ORAL at 07:16

## 2023-05-16 RX ADMIN — CITALOPRAM HYDROBROMIDE 10 MG: 10 TABLET ORAL at 07:17

## 2023-05-16 RX ADMIN — Medication 1 TABLET: at 07:16

## 2023-05-16 RX ADMIN — LISINOPRIL 20 MG: 20 TABLET ORAL at 07:16

## 2023-05-16 RX ADMIN — FLUTICASONE PROPIONATE 1 SPRAY: 50 SPRAY, METERED NASAL at 22:19

## 2023-05-16 RX ADMIN — CARVEDILOL 12.5 MG: 12.5 TABLET, FILM COATED ORAL at 16:26

## 2023-05-16 RX ADMIN — Medication 2000 UNITS: at 07:17

## 2023-05-16 RX ADMIN — LEVOTHYROXINE SODIUM 50 MCG: 0.05 TABLET ORAL at 06:14

## 2023-05-16 RX ADMIN — LISINOPRIL 20 MG: 20 TABLET ORAL at 22:18

## 2023-05-16 RX ADMIN — HYDROCHLOROTHIAZIDE 25 MG: 25 TABLET ORAL at 07:16

## 2023-05-16 ASSESSMENT — PAIN SCALES - GENERAL
PAINLEVEL_OUTOF10: 0

## 2023-05-16 NOTE — CARE COORDINATION
Faxed referral to [address] for the following request:        Emergency Response Kody Fowler for homemaking tasks      Home Delivered Meals. Spoke with Leon Dietrich, friend, who states they have chosen [address] [facility] for home care orders. Transportation home will be with [address], friend. Reviewed DME recommendations of TTB, TSF, walker basket or tray. Leon Dietrich reports they have all except for the TTB. Call placed to [address] [place] [address] [phone] ext [phone] to request TTB. She will call her dept called [facility] to see if they will provide one and send to her apartment. Referral initiated to [place]. Eric Vazquez     Case Management   [phone]    5/16/2023  8:55 AM  The Plan for Transition of Care is related to the following treatment goals: to continue her progress in personal care and ambulation in home setting. The Patient and/or patient representative friend, Delvis Brown was provided with a choice of provider and agrees   with the discharge plan. [x] Yes [] No    Freedom of choice list was provided with basic dialogue that supports the patient's individualized plan of care/goals, treatment preferences and shares the quality data associated with the providers.  [x] Yes [] No  Eric Vazquez     Case Management   [phone]    5/16/2023  8:56 AM no

## 2023-05-17 PROCEDURE — 97130 THER IVNTJ EA ADDL 15 MIN: CPT

## 2023-05-17 PROCEDURE — 6360000002 HC RX W HCPCS: Performed by: PHYSICAL MEDICINE & REHABILITATION

## 2023-05-17 PROCEDURE — 1280000000 HC REHAB R&B

## 2023-05-17 PROCEDURE — 92507 TX SP LANG VOICE COMM INDIV: CPT

## 2023-05-17 PROCEDURE — 97530 THERAPEUTIC ACTIVITIES: CPT

## 2023-05-17 PROCEDURE — 97116 GAIT TRAINING THERAPY: CPT

## 2023-05-17 PROCEDURE — 97110 THERAPEUTIC EXERCISES: CPT

## 2023-05-17 PROCEDURE — 94760 N-INVAS EAR/PLS OXIMETRY 1: CPT

## 2023-05-17 PROCEDURE — 97535 SELF CARE MNGMENT TRAINING: CPT

## 2023-05-17 PROCEDURE — 97112 NEUROMUSCULAR REEDUCATION: CPT

## 2023-05-17 PROCEDURE — 97129 THER IVNTJ 1ST 15 MIN: CPT

## 2023-05-17 PROCEDURE — 6370000000 HC RX 637 (ALT 250 FOR IP): Performed by: PHYSICAL MEDICINE & REHABILITATION

## 2023-05-17 RX ORDER — CARVEDILOL 12.5 MG/1
12.5 TABLET ORAL 2 TIMES DAILY WITH MEALS
Qty: 60 TABLET | Refills: 0 | Status: SHIPPED | OUTPATIENT
Start: 2023-05-17

## 2023-05-17 RX ORDER — CITALOPRAM 10 MG/1
10 TABLET ORAL DAILY
Qty: 30 TABLET | Refills: 0 | Status: SHIPPED | OUTPATIENT
Start: 2023-05-18

## 2023-05-17 RX ORDER — LISINOPRIL 20 MG/1
20 TABLET ORAL 2 TIMES DAILY
Qty: 60 TABLET | Refills: 0 | Status: SHIPPED | OUTPATIENT
Start: 2023-05-17

## 2023-05-17 RX ORDER — ALPRAZOLAM 0.25 MG/1
0.25 TABLET ORAL NIGHTLY PRN
Qty: 30 TABLET | Refills: 0 | Status: SHIPPED | OUTPATIENT
Start: 2023-05-17 | End: 2023-06-16

## 2023-05-17 RX ORDER — ASPIRIN 81 MG/1
81 TABLET ORAL DAILY
Qty: 30 TABLET | Refills: 0 | Status: SHIPPED | OUTPATIENT
Start: 2023-05-17

## 2023-05-17 RX ORDER — HYDROCHLOROTHIAZIDE 50 MG/1
50 TABLET ORAL DAILY
Qty: 30 TABLET | Refills: 0 | Status: SHIPPED | OUTPATIENT
Start: 2023-05-18

## 2023-05-17 RX ORDER — PRAVASTATIN SODIUM 80 MG/1
80 TABLET ORAL DAILY
Qty: 30 TABLET | Refills: 0 | Status: SHIPPED | OUTPATIENT
Start: 2023-05-17

## 2023-05-17 RX ADMIN — FLUTICASONE PROPIONATE 1 SPRAY: 50 SPRAY, METERED NASAL at 10:53

## 2023-05-17 RX ADMIN — PRAVASTATIN SODIUM 80 MG: 40 TABLET ORAL at 07:37

## 2023-05-17 RX ADMIN — CARVEDILOL 12.5 MG: 12.5 TABLET, FILM COATED ORAL at 07:38

## 2023-05-17 RX ADMIN — ASPIRIN 81 MG: 81 TABLET, COATED ORAL at 07:38

## 2023-05-17 RX ADMIN — ONDANSETRON HYDROCHLORIDE 4 MG: 4 TABLET, FILM COATED ORAL at 10:53

## 2023-05-17 RX ADMIN — CITALOPRAM HYDROBROMIDE 10 MG: 10 TABLET ORAL at 07:37

## 2023-05-17 RX ADMIN — GUAIFENESIN 600 MG: 600 TABLET ORAL at 07:37

## 2023-05-17 RX ADMIN — LISINOPRIL 20 MG: 20 TABLET ORAL at 20:09

## 2023-05-17 RX ADMIN — LEVOTHYROXINE SODIUM 50 MCG: 0.05 TABLET ORAL at 05:10

## 2023-05-17 RX ADMIN — Medication 2000 UNITS: at 07:37

## 2023-05-17 RX ADMIN — ENOXAPARIN SODIUM 30 MG: 100 INJECTION SUBCUTANEOUS at 07:38

## 2023-05-17 RX ADMIN — Medication 1 TABLET: at 07:37

## 2023-05-17 RX ADMIN — CETIRIZINE HYDROCHLORIDE 10 MG: 10 TABLET, FILM COATED ORAL at 07:37

## 2023-05-17 RX ADMIN — LISINOPRIL 20 MG: 20 TABLET ORAL at 07:37

## 2023-05-17 RX ADMIN — GUAIFENESIN 600 MG: 600 TABLET ORAL at 20:10

## 2023-05-17 RX ADMIN — Medication 3 MG: at 20:10

## 2023-05-17 RX ADMIN — HYDROCHLOROTHIAZIDE 50 MG: 25 TABLET ORAL at 07:37

## 2023-05-17 RX ADMIN — FLUTICASONE PROPIONATE 1 SPRAY: 50 SPRAY, METERED NASAL at 20:15

## 2023-05-17 RX ADMIN — ALPRAZOLAM 0.25 MG: 0.25 TABLET ORAL at 20:09

## 2023-05-17 ASSESSMENT — PAIN SCALES - GENERAL
PAINLEVEL_OUTOF10: 0

## 2023-05-17 NOTE — DISCHARGE INSTR - COC
Continuity of Care Form    Patient Name: Crystal Hernández   :  1940  MRN:  [id number]    Admit date:  2023  Discharge date:      Code Status Order: Full Code   Advance Directives:     Admitting Physician:  Arron Capps MD  PCP: Skip Salvador    Discharging Nurse: Odilia Unit/Room#: T1P-8435/3258-01  Discharging Unit Phone Number: [phone]    Emergency Contact:   Extended Emergency Contact Information  Primary Emergency Contact: Karla Rivero  Home Phone: [phone]  Relation: Brother/Sister  Secondary Emergency Contact:  Bela Mitchell  Mobile Phone: [phone]  Relation: Girlfriend    Past Surgical History:  Past Surgical History:   Procedure Laterality Date    BREAST SURGERY      HYSTERECTOMY (CERVIX STATUS UNKNOWN)         Immunization History:   Immunization History   Administered Date(s) Administered    Influenza Virus Vaccine 10/10/2018    Pneumococcal, PCV-13, PREVNAR 15, (age 6w+), IM, 0.5mL 10/10/2018       Active Problems:  Patient Active Problem List   Diagnosis Code    Hyponatremia E87.1    Hypokalemia R66.7    Acute metabolic encephalopathy C18.40    Nausea and vomiting R11.2    Acute ischemic stroke (HCC) I63.9       Isolation/Infection:   Isolation            No Isolation          Patient Infection Status       None to display            Nurse Assessment:  Last Vital Signs: BP (!) 168/52   Pulse 61   Temp 97.5 °F (36.4 °C) (Oral)   Resp 16   Ht 5' 5\" (1.651 m)   Wt 115 lb 11.9 oz (52.5 kg)   SpO2 94%   BMI 19.26 kg/m²     Last documented pain score (0-10 scale): Pain Level: 0  Last Weight:   Wt Readings from Last 1 Encounters:   23 115 lb 11.9 oz (52.5 kg)     Mental Status:  oriented, alert, and confusion at times noted    IV Access:  - None    Nursing Mobility/ADLs:  Walking   Assisted  Transfer  Assisted  Bathing  Assisted  Dressing  Assisted  Toileting  Assisted  Feeding  Independent  Med Admin  Assisted  Med Delivery

## 2023-05-17 NOTE — PATIENT CARE CONFERENCE
[facility]  Inpatient Rehabilitation  Weekly Team Conference Note      Date: 2023  Patient Name:  Lord Jackson    MRN: [id number]  : 1940  Gender:   Physician:   Diagnosis: Acute ischemic stroke [facility]) [I63.9]    CASE MANAGEMENT  Assessment: goal is home. PHYSICAL THERAPY    Bed Mobility:  Overall Assistance Level: Stand By Assist  Additional Factors: Head of bed flat, Increased time to complete, Without handrails  Sit>supine:  Assistance Level: Stand by assist  Skilled Clinical Factors: increased time  Supine>sit:  Assistance Level: Stand by assist  Skilled Clinical Factors: increased time and verbal cues for technique. Transfers:  Surface: Wheelchair  Additional Factors: Verbal cues, Hand placement cues, Increased time to complete  Device: Walker  Sit>stand:  Assistance Level: Contact guard assist, Minimal assistance  Skilled Clinical Factors: verbal cues for hand placement  Stand>sit:  Assistance Level: Minimal assistance, Contact guard assist  Skilled Clinical Factors: verbal cues for hand placement  Bed<>chair  Assistance Level: Minimal assistance, Contact guard assist  Skilled Clinical Factors: with wheeled walker. verbal cues    Car transfer:  Assistance Level: Contact guard assist  Skilled Clinical Factors: Mock car transfer done with wheeled walker. She was CGA for complete transfer but did also require verbal cues for hand placement. Ambulation:  Surface: Level surface  Device: Rolling walker  Distance: short distance in therapy gym, 125' with multiple turns  Activity: Within Room, Within Unit  Activity Comments: Patient had reduced right step length and height but improves with cues. she brushes objects on her right due to slight neglect. Patient needs verbal cues for safety with management of the walker while completing mobility. Increased cueing required with turns.  PT added dycem to the right handle of the walker and patient reporting she felt she
W/C: _____  [] Rolling Walker [] Standard walker [] Gait belt [] cane: _________  [] Sliding board [] Alternate seating/furniture [] O2 [] Hip Kit: _______  [] Life Line [x] Other: walker tray_______  Factors facilitating achievement of predicted outcomes: Friend support  Barriers to the achievement of predicted outcomes/Interventions:   Anxiety, right weakness, limited support, memory. Interdisciplinary Individualized Plan of Care Review:    Continue Current Plan of Care: Yes    Modifications:_____________________________    Special Needs in the Upcoming Week :    [] Family/Caregiver Education  [] Home visit  []Therapeutic Pass   [] Consults:_______    [] Other;_______    Patient Rehab Team Goals for the Upcoming Week:  1. No new goals, discharge today            Team Members Present at Conference:  Physician:Dr. Kaycee Morales MD  : [place], [place]    Occupational Therapist: aYima Spear, OTR/L #[id number]  [facility] [address] [facility]  Speech Therapist: Giuliana Burrell. Shravan,MS,CCC,SLP 3574  Speech and Language Pathologist    ARU Supervisor:Chiquis Boyle RN CRRN  Dietician: Marimar Pickering RD, LD  Psychologist:  Other:      I led this team conference and I approve the established interdisciplinary plan of care as documented within the medical record of Eugenio Duarte. MD: Trini Benton.  Kaycee Morales MD 5/18/2023, 12:31 PM

## 2023-05-17 NOTE — CARE COORDINATION
Met with patient and a friend in room to review DC tomorrow. IMM letter presented. Friend Gabrielle is on board to transport home.

## 2023-05-18 VITALS
DIASTOLIC BLOOD PRESSURE: 74 MMHG | TEMPERATURE: 97.4 F | SYSTOLIC BLOOD PRESSURE: 127 MMHG | RESPIRATION RATE: 16 BRPM | BODY MASS INDEX: 18.88 KG/M2 | HEART RATE: 66 BPM | OXYGEN SATURATION: 95 % | WEIGHT: 113.32 LBS | HEIGHT: 65 IN

## 2023-05-18 LAB
ANION GAP SERPL CALCULATED.3IONS-SCNC: 14 MMOL/L (ref 3–16)
BASOPHILS # BLD: 0 K/UL (ref 0–0.2)
BASOPHILS NFR BLD: 0.7 %
BUN SERPL-MCNC: 17 MG/DL (ref 7–20)
CALCIUM SERPL-MCNC: 8.8 MG/DL (ref 8.3–10.6)
CHLORIDE SERPL-SCNC: 92 MMOL/L (ref 99–110)
CO2 SERPL-SCNC: 22 MMOL/L (ref 21–32)
CREAT SERPL-MCNC: 1 MG/DL (ref 0.6–1.2)
DEPRECATED RDW RBC AUTO: 15.3 % (ref 12.4–15.4)
EOSINOPHIL # BLD: 0.2 K/UL (ref 0–0.6)
EOSINOPHIL NFR BLD: 2.5 %
GFR SERPLBLD CREATININE-BSD FMLA CKD-EPI: 55 ML/MIN/{1.73_M2}
GLUCOSE SERPL-MCNC: 103 MG/DL (ref 70–99)
HCT VFR BLD AUTO: 34.9 % (ref 36–48)
HGB BLD-MCNC: 12 G/DL (ref 12–16)
LYMPHOCYTES # BLD: 1.1 K/UL (ref 1–5.1)
LYMPHOCYTES NFR BLD: 16.6 %
MCH RBC QN AUTO: 27 PG (ref 26–34)
MCHC RBC AUTO-ENTMCNC: 34.2 G/DL (ref 31–36)
MCV RBC AUTO: 79 FL (ref 80–100)
MONOCYTES # BLD: 0.5 K/UL (ref 0–1.3)
MONOCYTES NFR BLD: 7.9 %
NEUTROPHILS # BLD: 4.7 K/UL (ref 1.7–7.7)
NEUTROPHILS NFR BLD: 72.3 %
PLATELET # BLD AUTO: 263 K/UL (ref 135–450)
PMV BLD AUTO: 8.6 FL (ref 5–10.5)
POTASSIUM SERPL-SCNC: 3.6 MMOL/L (ref 3.5–5.1)
RBC # BLD AUTO: 4.42 M/UL (ref 4–5.2)
SODIUM SERPL-SCNC: 128 MMOL/L (ref 136–145)
WBC # BLD AUTO: 6.5 K/UL (ref 4–11)

## 2023-05-18 PROCEDURE — 85025 COMPLETE CBC W/AUTO DIFF WBC: CPT

## 2023-05-18 PROCEDURE — 6370000000 HC RX 637 (ALT 250 FOR IP): Performed by: PHYSICAL MEDICINE & REHABILITATION

## 2023-05-18 PROCEDURE — 6360000002 HC RX W HCPCS: Performed by: PHYSICAL MEDICINE & REHABILITATION

## 2023-05-18 PROCEDURE — 80048 BASIC METABOLIC PNL TOTAL CA: CPT

## 2023-05-18 PROCEDURE — 36415 COLL VENOUS BLD VENIPUNCTURE: CPT

## 2023-05-18 RX ORDER — PANTOPRAZOLE SODIUM 20 MG/1
40 TABLET, DELAYED RELEASE ORAL
Qty: 60 TABLET | Refills: 0 | COMMUNITY
Start: 2023-05-18

## 2023-05-18 RX ADMIN — CARVEDILOL 12.5 MG: 12.5 TABLET, FILM COATED ORAL at 07:56

## 2023-05-18 RX ADMIN — Medication 1 TABLET: at 07:56

## 2023-05-18 RX ADMIN — ASPIRIN 81 MG: 81 TABLET, COATED ORAL at 07:55

## 2023-05-18 RX ADMIN — ENOXAPARIN SODIUM 30 MG: 100 INJECTION SUBCUTANEOUS at 07:55

## 2023-05-18 RX ADMIN — CETIRIZINE HYDROCHLORIDE 10 MG: 10 TABLET, FILM COATED ORAL at 07:56

## 2023-05-18 RX ADMIN — PRAVASTATIN SODIUM 80 MG: 40 TABLET ORAL at 07:56

## 2023-05-18 RX ADMIN — GUAIFENESIN 600 MG: 600 TABLET ORAL at 07:56

## 2023-05-18 RX ADMIN — LISINOPRIL 20 MG: 20 TABLET ORAL at 07:56

## 2023-05-18 RX ADMIN — CITALOPRAM HYDROBROMIDE 10 MG: 10 TABLET ORAL at 07:56

## 2023-05-18 RX ADMIN — FLUTICASONE PROPIONATE 1 SPRAY: 50 SPRAY, METERED NASAL at 07:57

## 2023-05-18 RX ADMIN — HYDROCHLOROTHIAZIDE 50 MG: 25 TABLET ORAL at 07:55

## 2023-05-18 RX ADMIN — Medication 2000 UNITS: at 07:55

## 2023-05-18 RX ADMIN — LEVOTHYROXINE SODIUM 50 MCG: 0.05 TABLET ORAL at 05:58

## 2023-05-18 NOTE — PLAN OF CARE
Problem: ABCDS Injury Assessment  Goal: Absence of physical injury  5/17/2023 2311 by Marilou Tejada RN  Outcome: Progressing     Problem: Skin/Tissue Integrity  Goal: Absence of new skin breakdown  Description: 1. Monitor for areas of redness and/or skin breakdown  2. Assess vascular access sites hourly  3. Every 4-6 hours minimum:  Change oxygen saturation probe site  4. Every 4-6 hours:  If on nasal continuous positive airway pressure, respiratory therapy assess nares and determine need for appliance change or resting period.   5/17/2023 2306 by Marilou Tejada RN  Outcome: Progressing     Problem: Pain  Goal: Verbalizes/displays adequate comfort level or baseline comfort level  5/17/2023 2311 by Marilou Tejada RN  Outcome: Progressing     Problem: Skin/Tissue Integrity - Adult  Goal: Skin integrity remains intact  5/17/2023 2306 by Marilou Tejada RN  Outcome: Progressing
Problem: Discharge Planning  Goal: Discharge to home or other facility with appropriate resources  5/10/2023 0015 by Benjamin Solis RN  Outcome: Progressing     Problem: Safety - Adult  Goal: Free from fall injury  5/10/2023 0015 by Benjamin Solis RN  Outcome: Progressing     Problem: ABCDS Injury Assessment  Goal: Absence of physical injury  5/10/2023 0015 by Benjamin Solis RN  Outcome: Progressing     Problem: Skin/Tissue Integrity  Goal: Absence of new skin breakdown  Description: 1. Monitor for areas of redness and/or skin breakdown  2. Assess vascular access sites hourly  3. Every 4-6 hours minimum:  Change oxygen saturation probe site  4. Every 4-6 hours:  If on nasal continuous positive airway pressure, respiratory therapy assess nares and determine need for appliance change or resting period.   5/10/2023 0015 by Benjamin Solis RN  Outcome: Progressing     Problem: Pain  Goal: Verbalizes/displays adequate comfort level or baseline comfort level  5/10/2023 0015 by Benjamin Solis RN  Outcome: Progressing
Problem: Discharge Planning  Goal: Discharge to home or other facility with appropriate resources  5/11/2023 2342 by Veronica Vyas RN  Outcome: Progressing  5/11/2023 1358 by Sean Leong RN  Outcome: Progressing     Problem: Safety - Adult  Goal: Free from fall injury  5/11/2023 2342 by Veronica Vyas RN  Outcome: Progressing  5/11/2023 1358 by Sean Leong RN  Outcome: Progressing     Problem: ABCDS Injury Assessment  Goal: Absence of physical injury  5/11/2023 2342 by Veronica Vyas RN  Outcome: Progressing  5/11/2023 1358 by Sean Leong RN  Outcome: Progressing     Problem: Skin/Tissue Integrity  Goal: Absence of new skin breakdown  Description: 1. Monitor for areas of redness and/or skin breakdown  2. Assess vascular access sites hourly  3. Every 4-6 hours minimum:  Change oxygen saturation probe site  4. Every 4-6 hours:  If on nasal continuous positive airway pressure, respiratory therapy assess nares and determine need for appliance change or resting period.   5/11/2023 2342 by Veronica Vyas RN  Outcome: Progressing  5/11/2023 1358 by Sean Leong RN  Outcome: Progressing     Problem: Pain  Goal: Verbalizes/displays adequate comfort level or baseline comfort level  5/11/2023 2342 by Veronica Vyas RN  Outcome: Progressing  5/11/2023 1358 by Sean Leong RN  Outcome: Progressing  Flowsheets (Taken 5/11/2023 0431 by Amy Vizcarra RN)  Verbalizes/displays adequate comfort level or baseline comfort level: Encourage patient to monitor pain and request assistance     Problem: Nutrition Deficit:  Goal: Optimize nutritional status  5/11/2023 2342 by Veronica Vyas RN  Outcome: Progressing  5/11/2023 1358 by Sean Leong RN  Outcome: Progressing
Problem: Discharge Planning  Goal: Discharge to home or other facility with appropriate resources  5/13/2023 0019 by Kay Lara RN  Outcome: Progressing     Problem: Safety - Adult  Goal: Free from fall injury  5/13/2023 0019 by Kay Lara RN  Outcome: Progressing     Problem: Skin/Tissue Integrity  Goal: Absence of new skin breakdown  Description: 1. Monitor for areas of redness and/or skin breakdown  2. Assess vascular access sites hourly  3. Every 4-6 hours minimum:  Change oxygen saturation probe site  4. Every 4-6 hours:  If on nasal continuous positive airway pressure, respiratory therapy assess nares and determine need for appliance change or resting period. 5/13/2023 0019 by Kay Lara RN  Outcome: Progressing     Problem: Pain  Goal: Verbalizes/displays adequate comfort level or baseline comfort level  5/13/2023 0019 by Kay Lara RN  Outcome: Progressing     Problem: Nutrition Deficit:  Goal: Optimize nutritional status  5/13/2023 0019 by Kay Lara RN  Outcome: Progressing     Problem: Infection - Adult  Goal: Absence of infection at discharge  Outcome: Progressing     Problem: Infection - Adult  Goal: Absence of infection during hospitalization  Outcome: Progressing     Problem: Metabolic/Fluid and Electrolytes - Adult  Goal: Electrolytes maintained within normal limits  Outcome: Progressing     Problem: Anxiety  Goal: Will report anxiety at manageable levels  Description: INTERVENTIONS:  1. Administer medication as ordered  2. Teach and rehearse alternative coping skills  3.  Provide emotional support with 1:1 interaction with staff  Outcome: Progressing
Problem: Discharge Planning  Goal: Discharge to home or other facility with appropriate resources  5/13/2023 1028 by Heather Burger RN  Outcome: Progressing  Flowsheets (Taken 5/13/2023 1028)  Discharge to home or other facility with appropriate resources: Identify barriers to discharge with patient and caregiver     Problem: Safety - Adult  Goal: Free from fall injury  5/13/2023 1028 by Heather Burger RN  Outcome: Progressing  Flowsheets (Taken 5/13/2023 1028)  Free From Fall Injury: Instruct family/caregiver on patient safety     Problem: ABCDS Injury Assessment  Goal: Absence of physical injury  Outcome: Progressing     Problem: Skin/Tissue Integrity  Goal: Absence of new skin breakdown  Description: 1. Monitor for areas of redness and/or skin breakdown  2. Assess vascular access sites hourly    5/13/2023 1028 by Heather Burger RN  Outcome: Progressing     Problem: Pain  Goal: Verbalizes/displays adequate comfort level or baseline comfort level  5/13/2023 1028 by Heather Burger RN  Outcome: Progressing     Problem: Anxiety  Goal: Will report anxiety at manageable levels  Description: INTERVENTIONS:  1. Administer medication as ordered  2. Teach and rehearse alternative coping skills  3.  Provide emotional support with 1:1 interaction with staff  5/13/2023   Outcome: Progressing
Problem: Discharge Planning  Goal: Discharge to home or other facility with appropriate resources  5/15/2023 2320 by Dorothy Clements RN  Outcome: Progressing     Problem: Safety - Adult  Goal: Free from fall injury  5/15/2023 2320 by Dorothy Clements RN  Outcome: Progressing  Problem: ABCDS Injury Assessment  Goal: Absence of physical injury  5/15/2023 2320 by Dorothy Clements RN  Outcome: Progressing     Problem: Skin/Tissue Integrity  Goal: Absence of new skin breakdown  Description: 1. Monitor for areas of redness and/or skin breakdown  2. Assess vascular access sites hourly  3. Every 4-6 hours minimum:  Change oxygen saturation probe site  4. Every 4-6 hours:  If on nasal continuous positive airway pressure, respiratory therapy assess nares and determine need for appliance change or resting period. 5/15/2023 2320 by Dorothy Clements RN  Outcome: Progressing     Problem: Pain  Goal: Verbalizes/displays adequate comfort level or baseline comfort level  5/15/2023 2320 by Dorothy Clements RN  Outcome: Progressing     Problem: Infection - Adult  Goal: Absence of infection at discharge  Outcome: Progressing     Problem: Infection - Adult  Goal: Absence of infection during hospitalization  Outcome: Progressing     Problem: Metabolic/Fluid and Electrolytes - Adult  Goal: Electrolytes maintained within normal limits  Outcome: Progressing     Problem: Anxiety  Goal: Will report anxiety at manageable levels  Description: INTERVENTIONS:  1. Administer medication as ordered  2. Teach and rehearse alternative coping skills  3.  Provide emotional support with 1:1 interaction with staff  Outcome: Progressing
Problem: Discharge Planning  Goal: Discharge to home or other facility with appropriate resources  5/17/2023 0118 by Demetri Rodgers RN  Outcome: Progressing     Problem: Safety - Adult  Goal: Free from fall injury  5/17/2023 0118 by Demetri Rodgers RN  Outcome: Progressing     Problem: Skin/Tissue Integrity  Goal: Absence of new skin breakdown  Description: 1. Monitor for areas of redness and/or skin breakdown  2. Assess vascular access sites hourly  3. Every 4-6 hours minimum:  Change oxygen saturation probe site  4. Every 4-6 hours:  If on nasal continuous positive airway pressure, respiratory therapy assess nares and determine need for appliance change or resting period.   5/17/2023 0118 by Demetri Rodgers RN  Outcome: Progressing     Problem: Pain  Goal: Verbalizes/displays adequate comfort level or baseline comfort level  5/17/2023 0118 by Demetri Rodgers RN  Outcome: Progressing
Problem: Discharge Planning  Goal: Discharge to home or other facility with appropriate resources  5/17/2023 1516 by Kaitlin Guerrier LPN  Outcome: Progressing  Flowsheets (Taken 5/17/2023 0743)  Discharge to home or other facility with appropriate resources: Identify barriers to discharge with patient and caregiver     Problem: Safety - Adult  Goal: Free from fall injury  5/17/2023 1516 by Kaitlin Guerrier LPN  Outcome: Progressing  Flowsheets (Taken 5/17/2023 1000)  Free From Fall Injury: Instruct family/caregiver on patient safety     Problem: ABCDS Injury Assessment  Goal: Absence of physical injury  Outcome: Progressing  Flowsheets (Taken 5/17/2023 1000)  Absence of Physical Injury: Implement safety measures based on patient assessment     Problem: Skin/Tissue Integrity  Goal: Absence of new skin breakdown  Description: 1. Monitor for areas of redness and/or skin breakdown  2. Assess vascular access sites hourly  3. Every 4-6 hours minimum:  Change oxygen saturation probe site  4. Every 4-6 hours:  If on nasal continuous positive airway pressure, respiratory therapy assess nares and determine need for appliance change or resting period.   5/17/2023 1516 by Kaitlin Guerrier LPN  Outcome: Progressing     Problem: Pain  Goal: Verbalizes/displays adequate comfort level or baseline comfort level  5/17/2023 1516 by Kaitlin Guerrier LPN  Outcome: Progressing     Problem: Nutrition Deficit:  Goal: Optimize nutritional status  Outcome: Progressing     Problem: Neurosensory - Adult  Goal: Achieves stable or improved neurological status  Outcome: Progressing  Flowsheets (Taken 5/17/2023 0743)  Achieves stable or improved neurological status: Initiate measures to prevent increased intracranial pressure     Problem: Neurosensory - Adult  Goal: Achieves maximal functionality and self care  Outcome: Progressing  Flowsheets (Taken 5/17/2023 0743)  Achieves maximal functionality and self care: Monitor swallowing and airway
Problem: Discharge Planning  Goal: Discharge to home or other facility with appropriate resources  5/8/2023 0953 by Xiao Johnson RN  Outcome: Progressing  Flowsheets (Taken 5/8/2023 0715)  Discharge to home or other facility with appropriate resources:   Identify barriers to discharge with patient and caregiver   Arrange for needed discharge resources and transportation as appropriate   Identify discharge learning needs (meds, wound care, etc)   Refer to discharge planning if patient needs post-hospital services based on physician order or complex needs related to functional status, cognitive ability or social support system     Problem: Safety - Adult  Goal: Free from fall injury  5/8/2023 0953 by Xiao Johnson RN  Outcome: Progressing  Flowsheets (Taken 5/8/2023 0952)  Free From Fall Injury: Instruct family/caregiver on patient safety     Problem: ABCDS Injury Assessment  Goal: Absence of physical injury  5/8/2023 0953 by Xiao Johnson RN  Outcome: Progressing  Flowsheets (Taken 5/8/2023 0952)  Absence of Physical Injury: Implement safety measures based on patient assessment     Problem: Skin/Tissue Integrity  Goal: Absence of new skin breakdown  Description: 1. Monitor for areas of redness and/or skin breakdown  2. Assess vascular access sites hourly  3. Every 4-6 hours minimum:  Change oxygen saturation probe site  4. Every 4-6 hours:  If on nasal continuous positive airway pressure, respiratory therapy assess nares and determine need for appliance change or resting period.   5/8/2023 0953 by Xiao Johnson RN  Outcome: Progressing     Problem: Pain  Goal: Verbalizes/displays adequate comfort level or baseline comfort level  5/8/2023 0953 by Xiao Johnson RN  Outcome: Progressing  Flowsheets (Taken 5/8/2023 0715)  Verbalizes/displays adequate comfort level or baseline comfort level:   Encourage patient to monitor pain and request assistance   Assess pain using appropriate pain scale     Problem:
Problem: Discharge Planning  Goal: Discharge to home or other facility with appropriate resources  5/9/2023 1554 by Jodee Benito RN  Outcome: Progressing     Problem: Safety - Adult  Goal: Free from fall injury  5/9/2023 1554 by Jodee Benito RN  Outcome: Progressing     Problem: ABCDS Injury Assessment  Goal: Absence of physical injury  5/9/2023 1554 by Jodee Benito RN  Outcome: Progressing     Problem: Skin/Tissue Integrity  Goal: Absence of new skin breakdown  Description: 1. Monitor for areas of redness and/or skin breakdown  2. Assess vascular access sites hourly  3. Every 4-6 hours minimum:  Change oxygen saturation probe site  4. Every 4-6 hours:  If on nasal continuous positive airway pressure, respiratory therapy assess nares and determine need for appliance change or resting period.   5/9/2023 1554 by Jodee Benito RN  Outcome: Progressing     Problem: Pain  Goal: Verbalizes/displays adequate comfort level or baseline comfort level  5/9/2023 1554 by Jodee Benito RN  Outcome: Progressing     Problem: Nutrition Deficit:  Goal: Optimize nutritional status  5/9/2023 1554 by Jodee Benito RN  Outcome: Progressing
Problem: Discharge Planning  Goal: Discharge to home or other facility with appropriate resources  Outcome: Progressing     Problem: Safety - Adult  Goal: Free from fall injury  Outcome: Progressing     Problem: ABCDS Injury Assessment  Goal: Absence of physical injury  Outcome: Progressing     Problem: Skin/Tissue Integrity  Goal: Absence of new skin breakdown  Description: 1. Monitor for areas of redness and/or skin breakdown  2. Assess vascular access sites hourly  3. Every 4-6 hours minimum:  Change oxygen saturation probe site  4. Every 4-6 hours:  If on nasal continuous positive airway pressure, respiratory therapy assess nares and determine need for appliance change or resting period.   Outcome: Progressing     Problem: Pain  Goal: Verbalizes/displays adequate comfort level or baseline comfort level  Outcome: Progressing  Flowsheets (Taken 5/11/2023 7821 by Dina Bedoya RN)  Verbalizes/displays adequate comfort level or baseline comfort level: Encourage patient to monitor pain and request assistance     Problem: Nutrition Deficit:  Goal: Optimize nutritional status  Outcome: Progressing
Problem: Discharge Planning  Goal: Discharge to home or other facility with appropriate resources  Outcome: Progressing  Flowsheets (Taken 5/10/2023 1937)  Discharge to home or other facility with appropriate resources: Identify barriers to discharge with patient and caregiver     Problem: Safety - Adult  Goal: Free from fall injury  Outcome: Progressing  Flowsheets (Taken 5/10/2023 1937)  Free From Fall Injury: Instruct family/caregiver on patient safety
Problem: Discharge Planning  Goal: Discharge to home or other facility with appropriate resources  Outcome: Progressing  Flowsheets (Taken 5/15/2023 0900 by Elissa Rivera LPN)  Discharge to home or other facility with appropriate resources: Identify barriers to discharge with patient and caregiver     Problem: Safety - Adult  Goal: Free from fall injury  Outcome: Progressing     Problem: ABCDS Injury Assessment  Goal: Absence of physical injury  Outcome: Progressing     Problem: Skin/Tissue Integrity  Goal: Absence of new skin breakdown  Description: 1. Monitor for areas of redness and/or skin breakdown  2. Assess vascular access sites hourly  3. Every 4-6 hours minimum:  Change oxygen saturation probe site  4. Every 4-6 hours:  If on nasal continuous positive airway pressure, respiratory therapy assess nares and determine need for appliance change or resting period. Outcome: Progressing  Note: Skin assessment completed on admission and every shift. Barrier wipes used in the event of incontinence. Pressure relief techniques used as needed while in chair and in bed. Position changes encouraged at least every two hours while in bed.         Problem: Pain  Goal: Verbalizes/displays adequate comfort level or baseline comfort level  Outcome: Progressing
Problem: Discharge Planning  Goal: Discharge to home or other facility with appropriate resources  Outcome: Progressing  Flowsheets (Taken 5/16/2023 0815)  Discharge to home or other facility with appropriate resources: Identify barriers to discharge with patient and caregiver     Problem: Safety - Adult  Goal: Free from fall injury  Outcome: Progressing  Flowsheets (Taken 5/16/2023 1436)  Free From Fall Injury: Instruct family/caregiver on patient safety     Problem: ABCDS Injury Assessment  Goal: Absence of physical injury  Outcome: Progressing  Flowsheets (Taken 5/16/2023 1436)  Absence of Physical Injury: Implement safety measures based on patient assessment     Problem: Skin/Tissue Integrity  Goal: Absence of new skin breakdown  Description: 1. Monitor for areas of redness and/or skin breakdown  2. Assess vascular access sites hourly  3. Every 4-6 hours minimum:  Change oxygen saturation probe site  4. Every 4-6 hours:  If on nasal continuous positive airway pressure, respiratory therapy assess nares and determine need for appliance change or resting period.   Outcome: Progressing     Problem: Pain  Goal: Verbalizes/displays adequate comfort level or baseline comfort level  Outcome: Progressing     Problem: Nutrition Deficit:  Goal: Optimize nutritional status  Outcome: Progressing     Problem: Neurosensory - Adult  Goal: Achieves stable or improved neurological status  Outcome: Progressing  Flowsheets (Taken 5/16/2023 0815)  Achieves stable or improved neurological status:   Assess for and report changes in neurological status   Maintain blood pressure and fluid volume within ordered parameters to optimize cerebral perfusion and minimize risk of hemorrhage  Goal: Achieves maximal functionality and self care  Outcome: Progressing  Flowsheets (Taken 5/16/2023 0815)  Achieves maximal functionality and self care: Encourage and assist patient to increase activity and self care with guidance from physical
Problem: Pain  Goal: Verbalizes/displays adequate comfort level or baseline comfort level  5/17/2023 2311 by Nia Navarrete RN  Outcome: Progressing     Problem: Skin/Tissue Integrity - Adult  Goal: Skin integrity remains intact  5/17/2023 2306 by Nia Navarrete, RN  Outcome: Progressing
Problem: Safety - Adult  Goal: Free from fall injury  5/17/2023 2306 by Blanka Vaca RN  Outcome: Progressing     Problem: Skin/Tissue Integrity  Goal: Absence of new skin breakdown  Description: 1. Monitor for areas of redness and/or skin breakdown  2. Assess vascular access sites hourly  3. Every 4-6 hours minimum:  Change oxygen saturation probe site  4. Every 4-6 hours:  If on nasal continuous positive airway pressure, respiratory therapy assess nares and determine need for appliance change or resting period.   5/17/2023 2306 by Blanka Vaca RN  Outcome: Progressing     Problem: Skin/Tissue Integrity - Adult  Goal: Skin integrity remains intact  5/17/2023 2306 by Blanka Vaca RN  Outcome: Progressing
Problem: Safety - Adult  Goal: Free from fall injury  Outcome: Progressing  Free From Fall Injury: Instruct family/caregiver on patient safety  Trish-cam in use, Pt currently in bed- eyes closed. Will continue to monitor. Problem: Skin/Tissue Integrity  Goal: Absence of new skin breakdown  Description: 1. Monitor for areas of redness and/or skin breakdown  2. Assess vascular access sites hourly  3. Every 4-6 hours minimum:  Change oxygen saturation probe site  4. Every 4-6 hours:  If on nasal continuous positive airway pressure, respiratory therapy assess nares and determine need for appliance change or resting period.   Outcome: Progressing     Problem: Pain  Goal: Verbalizes/displays adequate comfort level or baseline comfort level  Outcome: Progressing  Verbalizes/displays adequate comfort level or baseline comfort level:   Encourage patient to monitor pain and request assistance   Assess pain using appropriate pain scale   Administer analgesics based on type and severity of pain and evaluate response   Implement non-pharmacological measures as appropriate and evaluate response
Problem: Safety - Adult  Goal: Free from fall injury  Outcome: Progressing  Note: Fall risk band on patient. Orange light on outside of room. Non skid footwear in place. Alarms used appropriately. Patient instructed to call and wait for staff before getting up. Rounding done to anticipate needs. Appropriate safety devices used for transfers.
appropriate for improving, restoring, or maintaining nutritional needs:   Monitor oral intake, labs, and treatment plans   Recommend appropriate diets, oral nutritional supplements, and vitamin/mineral supplements
Dying  Goal: Pt/Family communicate acceptance of impending death and feel psychological comfort and peace  Description: INTERVENTIONS:  1. Assess patient/family anxiety and grief process related to end of life issues  2. Provide emotional and spiritual support  3. Provide information about the patient's health status with consideration of family and cultural values  4. Communicate willingness to discuss death and facilitate grief process  with patient/family as appropriate  5. Emphasize sustaining relationships within family system and community, or yunior/spiritual traditions  6. Initiate Spiritual Care, Psychosocial Clinical Specialist, consult as needed  Outcome: Progressing     Problem: Change in Body Image  Goal: Pt/Family communicate acceptance of loss or change in body image and feel psychological comfort and peace  Description: INTERVENTIONS:  1. Assess patient/family anxiety and grief process related to change in body image, loss of functional status, loss of sense of self, and forgiveness  2. Provide emotional and spiritual support  3. Provide information about the patient's health status with consideration of family and cultural values  4. Communicate willingness to discuss loss and facilitate grief process with patient/family as appropriate  5. Emphasize sustaining relationships within family system and community, or yunior/spiritual traditions  6. Initiate Spiritual Care, Psychosocial Clinical Specialist consult as needed  Outcome: Progressing     Problem: Decision Making  Goal: Pt/Family able to effectively weigh alternatives and participate in decision making related to treatment and care  Description: INTERVENTIONS:  1. Determine when there are differences between patient's view, family's view, and healthcare provider's view of condition  2. Facilitate patient and family articulation of goals for care  3. Help patient and family identify pros/cons of alternative solutions  4.  Provide information as
Progressing  Flowsheets (Taken 5/16/2023 1436)  Oral Mucous Membranes Remain Intact: Assess oral mucosa and hygiene practices     Problem: Musculoskeletal - Adult  Goal: Return mobility to safest level of function  Outcome: Progressing  Flowsheets (Taken 5/16/2023 0815)  Return Mobility to Safest Level of Function:   Assess patient stability and activity tolerance for standing, transferring and ambulating with or without assistive devices   Assist with transfers and ambulation using safe patient handling equipment as needed  Goal: Return ADL status to a safe level of function  Outcome: Progressing  Flowsheets (Taken 5/16/2023 0815)  Return ADL Status to a Safe Level of Function: Administer medication as ordered

## 2023-05-18 NOTE — CARE COORDINATION
SOCIAL WORK DISCHARGE SUMMARY        DATE OF DISCHARGE:   Thursday, 5-      LOCATION:    Home      Discharging to Facility/ Agency   Name: [facility]   Phone:  [phone]   Fax:   [phone]             TIME:   Kyra Owens        PHARMACY:  [facility]        DME:      Referral sent to [facility] for TTB.     [place], [place]     Case Management   [phone]    5/18/2023  8:51 AM

## 2023-05-18 NOTE — CARE COORDINATION
[facility] Coom[facility] has accepted this referral.  [facility]     [facility]   [phone]    5/18/2023  9:23 AM

## 2023-05-18 NOTE — CARE COORDINATION
Met with patient and a friend yesterday to review DC for Thursday. Pt expresses she is a little anxious about going home. Reviewed safety practices of walking with her walker at all times, waiting for home care to assist her with bathing tasks. Informed her of referral made to [facility] for an emergency response button, MOW and home care aid for purpose of cleaning, laundry and shopping. Discussion held regarding [employer] Waiver for Assisted Living. She reports she would like to pursue this avenue and informed her I will initiate a referral to [facility] for this to take place and informed her this may take a while as she will need to be assessed if she is eligible for Medicaid primarily. Informed her that the home care agency that had been chosen is unable to service due to staffing. She wants me to call her sister to choose another agency. The friend in the room is calling to determine what time her ride will pick her up tomorrow. Call placled to [facility] Duggan yesterday to review. Reviewed CMS star rated list of agencies with her over the phone as she had previously given this task to the friend, Carla Pelletier, who is out of town. Education given regarding CMS star rating system and she chose [facility]. She is in agreement with Referral to initiate Assisted Living Waiver. She will call the friend, Jenna to find out the time of .   [place], [place]     Case Management   [phone]    5/18/2023  8:46 AM

## 2023-05-18 NOTE — PROGRESS NOTES
ACUTE REHAB UNIT  SPEECH/LANGUAGE PATHOLOGY      [x] Daily  [] Weekly Care Conference Note  [] Discharge    Patient:Chiquis Campbell      MBX:58/8/1216  CUB:[id number]  Rehab Dx/Hx: Acute ischemic stroke [facility]) [I63.9]    Precautions: Fall risk  Home situation: Lives alone has friend support for transport/store  ST Dx: [] Aphasia  [] Dysarthria  [] Apraxia   [] Oropharyngeal dysphagia [x] Cognitive   Impairment  [x] Other: dysarthria  Initial Speech Therapy Assessment Diagnosis:   1. Cognitive Diagnosis: Moderate cognitive-language impairments in the domains for alternating/divided attention, delayed recall, and numeric reasoning with concern for potential reduced executive function d/t aforementioned impairments. Patient with adeqaute verbalization for solutions to daily problems and reasoning tasks, but with suspected potential for increased difficulty with more complex executive function tasks as well as carryover during functional tasks. Patient requires mod cues for alternating attention and verbalizes recognition for decreased function. Poor delayed recall of novel informaiton even with cueing. Mod cues for basic calculations as well as finance and temporal reasoning with suspicion reduced word-finding may be an exacerbating factor. 2. Aphasia Diagnosis: Auditory and reading comprehension appear grossly WFL. Concern for mild anomia characterized by reduced confrontation naming for infrequent objects/items and decreased divergent naming. Occasional decreased word-finding at conversational level does not appear to significnatly impact self-expression. 3. Speech Diagnosis: Somewhat variable dysarthria noted as mild initially declining to mild-moderate with increasing fatigue during session. Patient presents with reduced breath support for sustained phonation and amplification as well as R>L labial and lingual weakness resulting in articulatory imprecison and slurred speech.      Date of Admit: 5/5/2023  Room #:
ACUTE REHAB UNIT  SPEECH/LANGUAGE PATHOLOGY      [x] Daily  [] Weekly Care Conference Note  [] Discharge    Patient:Chiquis Saavedra      VPR:29/9/5586  YZD:[id number]  Rehab Dx/Hx: Acute ischemic stroke [facility]) [I63.9]    Precautions: Fall risk  Home situation: Lives alone has friend support for transport/store  ST Dx: [] Aphasia  [] Dysarthria  [] Apraxia   [] Oropharyngeal dysphagia [x] Cognitive   Impairment  [x] Other: dysarthria  Initial Speech Therapy Assessment Diagnosis:   1. Cognitive Diagnosis: Moderate cognitive-language impairments in the domains for alternating/divided attention, delayed recall, and numeric reasoning with concern for potential reduced executive function d/t aforementioned impairments. Patient with adeqaute verbalization for solutions to daily problems and reasoning tasks, but with suspected potential for increased difficulty with more complex executive function tasks as well as carryover during functional tasks. Patient requires mod cues for alternating attention and verbalizes recognition for decreased function. Poor delayed recall of novel informaiton even with cueing. Mod cues for basic calculations as well as finance and temporal reasoning with suspicion reduced word-finding may be an exacerbating factor. 2. Aphasia Diagnosis: Auditory and reading comprehension appear grossly WFL. Concern for mild anomia characterized by reduced confrontation naming for infrequent objects/items and decreased divergent naming. Occasional decreased word-finding at conversational level does not appear to significnatly impact self-expression. 3. Speech Diagnosis: Somewhat variable dysarthria noted as mild initially declining to mild-moderate with increasing fatigue during session. Patient presents with reduced breath support for sustained phonation and amplification as well as R>L labial and lingual weakness resulting in articulatory imprecison and slurred speech.      Date of Admit: 5/5/2023  Room #:
ACUTE REHAB UNIT  SPEECH/LANGUAGE PATHOLOGY      [x] Daily  [] Weekly Care Conference Note  [] Discharge    Patient:Chiquis Vilchis      UFD:83/0/2457  YYU:[id number]  Rehab Dx/Hx: Acute ischemic stroke [facility]) [I63.9]    Precautions: Fall risk  Home situation: Lives alone has friend support for transport/store  ST Dx: [] Aphasia  [] Dysarthria  [] Apraxia   [] Oropharyngeal dysphagia [x] Cognitive   Impairment  [x] Other: dysarthria  Initial Speech Therapy Assessment Diagnosis:   1. Cognitive Diagnosis: Moderate cognitive-language impairments in the domains for alternating/divided attention, delayed recall, and numeric reasoning with concern for potential reduced executive function d/t aforementioned impairments. Patient with adeqaute verbalization for solutions to daily problems and reasoning tasks, but with suspected potential for increased difficulty with more complex executive function tasks as well as carryover during functional tasks. Patient requires mod cues for alternating attention and verbalizes recognition for decreased function. Poor delayed recall of novel informaiton even with cueing. Mod cues for basic calculations as well as finance and temporal reasoning with suspicion reduced word-finding may be an exacerbating factor. 2. Aphasia Diagnosis: Auditory and reading comprehension appear grossly WFL. Concern for mild anomia characterized by reduced confrontation naming for infrequent objects/items and decreased divergent naming. Occasional decreased word-finding at conversational level does not appear to significnatly impact self-expression. 3. Speech Diagnosis: Somewhat variable dysarthria noted as mild initially declining to mild-moderate with increasing fatigue during session. Patient presents with reduced breath support for sustained phonation and amplification as well as R>L labial and lingual weakness resulting in articulatory imprecison and slurred speech.      Date of Admit: 5/5/2023  Room #:
ACUTE REHAB UNIT  SPEECH/LANGUAGE PATHOLOGY      [x] Daily  [] Weekly Care Conference Note  [x] Discharge    Patient:Chiquis Sanchez      BROOKS:[birth year]  [facility]:[id number]  Rehab Dx/Hx: Acute ischemic stroke [facility]) [I63.9]    Precautions: Fall risk  Home situation: Lives alone has friend support for transport/store  ST Dx: [] Aphasia  [] Dysarthria  [] Apraxia   [] Oropharyngeal dysphagia [x] Cognitive   Impairment  [x] Other: dysarthria  Initial Speech Therapy Assessment Diagnosis:   1. Cognitive Diagnosis: Moderate cognitive-language impairments in the domains for alternating/divided attention, delayed recall, and numeric reasoning with concern for potential reduced executive function d/t aforementioned impairments. Patient with adeqaute verbalization for solutions to daily problems and reasoning tasks, but with suspected potential for increased difficulty with more complex executive function tasks as well as carryover during functional tasks. Patient requires mod cues for alternating attention and verbalizes recognition for decreased function. Poor delayed recall of novel informaiton even with cueing. Mod cues for basic calculations as well as finance and temporal reasoning with suspicion reduced word-finding may be an exacerbating factor. 2. Aphasia Diagnosis: Auditory and reading comprehension appear grossly WFL. Concern for mild anomia characterized by reduced confrontation naming for infrequent objects/items and decreased divergent naming. Occasional decreased word-finding at conversational level does not appear to significnatly impact self-expression. 3. Speech Diagnosis: Somewhat variable dysarthria noted as mild initially declining to mild-moderate with increasing fatigue during session. Patient presents with reduced breath support for sustained phonation and amplification as well as R>L labial and lingual weakness resulting in articulatory imprecison and slurred speech.      Date of Admit: 5/5/2023  Room
CLINICAL PHARMACY NOTE: MEDS TO BEDS    Total # of Prescriptions Filled: 7   The following medications were delivered to the patient:      ALPRAZolam 0.25 MG tablet    aspirin 81 MG EC tablet    carvedilol 12.5 MG tablet    citalopram 10 MG tablet    hydroCHLOROthiazide 50 MG tablet    lisinopril 20 MG tablet    pravastatin 80 MG tablet
Comprehensive Nutrition Assessment    Type and Reason for Visit:  Reassess    Nutrition Recommendations/Plan:   Continue Regular diet     Malnutrition Assessment:  Malnutrition Status: At risk for malnutrition (Comment) (05/07/23 3068)    Context:          Nutrition Assessment:    Follow up:  Currently on a regular diet. PO intake had been mostly % up until this am, at 25%. Last BM noted on 5/6, Senokot and Miralax given per nursing EMR earlier this am.  Patient drinking Miralax at time of visit, calling nurse to attempt BM. Patient denied any nutritional needs at this time. Nutrition Related Findings:    labs reviewed,  Na 134 on 5/11 Wound Type: Surgical Incision       Current Nutrition Intake & Therapies:    Average Meal Intake: %, 51-75%  Average Supplements Intake: Unable to assess  ADULT DIET; Regular    Anthropometric Measures:  Height: 5' 5\" (165.1 cm)  Ideal Body Weight (IBW): 125 lbs (57 kg)       Current Body Weight: 117 lb 4 oz (53.2 kg),   IBW.  Weight Source: Bed Scale  Current BMI (kg/m2): 19.5                          BMI Categories: Underweight (BMI less than 22) age over 72    Estimated Daily Nutrient Needs:  Energy Requirements Based On: Kcal/kg  Weight Used for Energy Requirements: Current (52.6 kg)  Energy (kcal/day): 3328-0237 (30-32 kcal/52.6 kg)  Weight Used for Protein Requirements: Current  Protein (g/day): 68-79 (1.3-1.5 g/52.6 kg)  Method Used for Fluid Requirements: 1 ml/kcal  Fluid (ml/day):      Nutrition Diagnosis:   Increased nutrient needs related to increase demand for energy/nutrients as evidenced by  (increased need for therapy; advanced age)    Nutrition Interventions:   Food and/or Nutrient Delivery: Continue Current Diet  Nutrition Education/Counseling:  (monitor need)  Coordination of Nutrition Care: Continue to monitor while inpatient       Goals:     Goals: PO intake 75% or greater       Nutrition Monitoring and Evaluation:      Food/Nutrient Intake
Comprehensive Nutrition Assessment    Type and Reason for Visit:  Reassess    Nutrition Recommendations/Plan:   Continue regular diet  Encourage food sources of protein at each meal and snack (patient does like eggs, cheese and peanut butter)     Malnutrition Assessment:  Malnutrition Status: At risk for malnutrition (Comment) (05/07/23 3385)    Context:          Nutrition Assessment:    Follow up:  Patient stated eating fairly well, refused need for any nutrition supplements. RD encouraged food sources of protein, patient stated enjoying eggs with cheese and peanut butter but can not tolerate cow's milk. Usual weight 115 lbs, current weight 112 lbs. Will monitor. Nutrition Related Findings:    Na 134 this am; last bm on 5/6 Wound Type: Surgical Incision       Current Nutrition Intake & Therapies:    Average Meal Intake: %  Average Supplements Intake: Unable to assess      Anthropometric Measures:  Height: 5' 5\" (165.1 cm)  Ideal Body Weight (IBW): 125 lbs (57 kg)       Current Body Weight: 112 lb 7 oz (51 kg),   IBW.  Weight Source: Bed Scale  Current BMI (kg/m2): 18.7                          BMI Categories: Underweight (BMI less than 22) age over 72    Estimated Daily Nutrient Needs:  Energy Requirements Based On: Kcal/kg  Weight Used for Energy Requirements: Current (52.6 kg)  Energy (kcal/day): 1804-1106 (30-32 kcal/52.6 kg)  Weight Used for Protein Requirements: Current  Protein (g/day): 68-79 (1.3-1.5 g/52.6 kg)  Method Used for Fluid Requirements: 1 ml/kcal  Fluid (ml/day):      Nutrition Diagnosis:   Increased nutrient needs related to increase demand for energy/nutrients as evidenced by  (increased need for therapy; advanced age)    Nutrition Interventions:   Food and/or Nutrient Delivery: Continue Current Diet  Nutrition Education/Counseling:  (monitor need)  Coordination of Nutrition Care: Continue to monitor while inpatient       Goals:     Goals: PO intake 75% or greater       Nutrition
Comprehensive Nutrition Assessment    Type and Reason for Visit:  Reassess    Nutrition Recommendations/Plan:   Continue regular diet  Trial Ensure at home-gave samples and coupons     Malnutrition Assessment:  Malnutrition Status: At risk for malnutrition (Comment) (05/07/23 2581)    Context:          Nutrition Assessment:    Follow up:  Continues on a regular diet. PO intake variable % meals, decreased in the past 2 days. RD provided patient's family Ensure sample and coupons to take home, family very appreciative. Patient was speaking with nurse regarding medications at home. Nutrition Related Findings:    labs reviewed, lytes within normal limits Wound Type: Surgical Incision       Current Nutrition Intake & Therapies:    Average Meal Intake: 26-50%, 51-75%, %  Average Supplements Intake: None Ordered      Anthropometric Measures:  Height: 5' 5\" (165.1 cm)  Ideal Body Weight (IBW): 125 lbs (57 kg)       Current Body Weight: 113 lb 5 oz (51.4 kg),   IBW. Weight Source: Bed Scale  Current BMI (kg/m2): 18.9                          BMI Categories: Normal Weight (BMI 18.5-24. 9)    Estimated Daily Nutrient Needs:  Energy Requirements Based On: Kcal/kg  Weight Used for Energy Requirements: Current (52.6 kg)  Energy (kcal/day): 2432-0375 (30-32 kcal/52.6 kg)  Weight Used for Protein Requirements: Current  Protein (g/day): 68-79 (1.3-1.5 g/52.6 kg)  Method Used for Fluid Requirements: 1 ml/kcal  Fluid (ml/day):      Nutrition Diagnosis:   Increased nutrient needs related to increase demand for energy/nutrients as evidenced by  (increased need for therapy; advanced age)    Nutrition Interventions:   Food and/or Nutrient Delivery: Continue Current Diet  Nutrition Education/Counseling:  (monitor need)  Coordination of Nutrition Care: Continue to monitor while inpatient       Goals:     Goals: PO intake 75% or greater       Nutrition Monitoring and Evaluation:      Food/Nutrient Intake Outcomes: Food and
Cues for walker, states does not want to go to therapy, reviewed purpose of therapy.
Department of Physical Medicine & Rehabilitation  Progress Note    Patient Identification:  Candi Gr  [id number]  : 1940  Admit date: 2023    Chief Complaint: Acute ischemic stroke ([address]    Subjective:   No acute events overnight. CT head obtained Friday night due to severe headache. Stable with no acute abnormalities. Patient seen this am sitting up in room. Reports headache improved. She is concerned about her ability to return home due to memory difficulty and right side weakness. Discussed rehab goals and plan of care. She is reassured and in agreement. Labs reviewed. ROS: No f/c, n/v, cp     Objective:  Patient Vitals for the past 24 hrs:   BP Temp Temp src Pulse Resp SpO2 Weight   23 0932 -- -- -- -- -- 97 % --   23 0715 (!) 203/66 97.8 °F (36.6 °C) Oral 61 18 97 % --   23 0317 (!) 155/60 97.5 °F (36.4 °C) Oral 54 18 96 % 112 lb 7 oz (51 kg)   23 1927 (!) 156/73 98.2 °F (36.8 °C) Oral 64 18 95 % --   23 1509 107/64 98.2 °F (36.8 °C) Oral 58 17 95 % --     Const: Alert. No distress, pleasant. HEENT: Normocephalic, atraumatic. Normal sclera/conjunctiva. MMM. CV: Regular rate and rhythm. Resp: No respiratory distress. Lungs CTAB. Abd: Soft, nontender, nondistended, NABS+   Ext: No edema. Neuro: Alert, oriented but with impaired recall. Right facial droop and hemiparesis (3/5 RUE, 4/5 RLE)  Psych: Cooperative, appropriate mood and affect    Laboratory data: Available via EMR.    Last 24 hour lab  Recent Results (from the past 24 hour(s))   Basic Metabolic Panel w/ Reflex to MG    Collection Time: 23  8:42 AM   Result Value Ref Range    Sodium 134 (L) 136 - 145 mmol/L    Potassium reflex Magnesium 3.6 3.5 - 5.1 mmol/L    Chloride 103 99 - 110 mmol/L    CO2 22 21 - 32 mmol/L    Anion Gap 9 3 - 16    Glucose 97 70 - 99 mg/dL    BUN 16 7 - 20 mg/dL    Creatinine 1.0 0.6 - 1.2 mg/dL    Est, Glom Filt Rate 55 (A) >60    Calcium 8.8 8.3 - 10.6
Department of Physical Medicine & Rehabilitation  Progress Note    Patient Identification:  Carlitos Mary  [id number]  : 1940  Admit date: 2023    Chief Complaint: Acute ischemic stroke ([address]    Subjective:   No acute events overnight. Called by RN and informed patient having \"congestion. \" Increased flonase to BID and added mucinex. Patient seen this morning sitting up in gym. Reports above changes were helpful for her congestion. She denies other new concerns. Labs reviewed. ROS: No f/c, n/v, cp     Objective:  Patient Vitals for the past 24 hrs:   BP Temp Temp src Pulse Resp SpO2 Weight   23 0935 -- -- -- -- -- 96 % --   23 0753 (!) 195/69 -- -- 62 16 95 % --   23 0621 (!) 190/71 97.6 °F (36.4 °C) Oral 52 16 95 % 114 lb 10.2 oz (52 kg)   23 (!) 145/68 97.5 °F (36.4 °C) Oral 58 16 96 % --     Const: Alert. No distress, pleasant. HEENT: Normocephalic, atraumatic. Normal sclera/conjunctiva. MMM. CV: Regular rate and rhythm. Resp: No respiratory distress. Lungs CTAB. Abd: Soft, nontender, nondistended, NABS+   Ext: No edema. Neuro: Alert, oriented but with impaired recall. Right facial droop and hemiparesis (3/5 RUE, 4/5 RLE)  Psych: Cooperative, appropriate mood and affect    Laboratory data: Available via EMR. Last 24 hour lab  No results found for this or any previous visit (from the past 24 hour(s)).       Therapy progress:  Physical therapy:  Bed Mobility:     Sit>supine:     Supine>sit:     Transfers:  Surface: Wheelchair  Additional Factors: Verbal cues, Hand placement cues, Increased time to complete  Device: Walker  Sit>stand:  Assistance Level: Contact guard assist, Minimal assistance  Skilled Clinical Factors: verbal cues for hand placement  Stand>sit:  Assistance Level: Minimal assistance, Contact guard assist  Skilled Clinical Factors: verbal cues for hand placement  Bed<>chair  Assistance Level: Minimal assistance  Skilled Clinical
Department of Physical Medicine & Rehabilitation  Progress Note    Patient Identification:  Kimberly Lane  [id number]  : 1940  Admit date: 2023    Chief Complaint: Acute ischemic stroke ([address]    Subjective:   No acute events overnight. Patient seen this afternoon resting in bed. She is fatigued from therapies. Friends are here for training. They ask appropriate questions about medications. Also ask about anxiety management. Patient now agreeable to trying SSRI. Labs reviewed. ROS: No f/c, n/v, cp     Objective:  Patient Vitals for the past 24 hrs:   BP Temp Temp src Pulse Resp SpO2 Weight   05/15/23 1008 -- -- -- -- -- 95 % --   05/15/23 0740 (!) 148/65 -- -- 61 -- -- --   05/15/23 0610 -- -- -- -- -- -- 120 lb 5.9 oz (54.6 kg)   05/15/23 0518 (!) 195/79 97.5 °F (36.4 °C) Oral 61 14 96 % --   23 1654 (!) 164/72 97.7 °F (36.5 °C) Oral 65 16 97 % --   23 1442 (!) 164/66 97.3 °F (36.3 °C) Oral 60 17 98 % --     Const: Alert. No distress, pleasant. HEENT: Normocephalic, atraumatic. Normal sclera/conjunctiva. MMM. CV: Regular rate and rhythm. Resp: No respiratory distress. Lungs CTAB. Abd: Soft, nontender, nondistended, NABS+   Ext: No edema. Neuro: Alert, oriented but with impaired recall. Right facial droop and hemiparesis (4/5 RUE, 4/5 RLE)  Psych: Cooperative, appropriate mood and affect    Laboratory data: Available via EMR.    Last 24 hour lab  Recent Results (from the past 24 hour(s))   CBC with Auto Differential    Collection Time: 05/15/23  7:52 AM   Result Value Ref Range    WBC 5.2 4.0 - 11.0 K/uL    RBC 4.29 4.00 - 5.20 M/uL    Hemoglobin 11.7 (L) 12.0 - 16.0 g/dL    Hematocrit 35.6 (L) 36.0 - 48.0 %    MCV 83.1 80.0 - 100.0 fL    MCH 27.3 26.0 - 34.0 pg    MCHC 32.9 31.0 - 36.0 g/dL    RDW 15.9 (H) 12.4 - 15.4 %    Platelets 941 058 - 408 K/uL    MPV 8.5 5.0 - 10.5 fL    Neutrophils % 60.9 %    Lymphocytes % 25.8 %    Monocytes % 6.9 %    Eosinophils % 5.4 %
Department of Physical Medicine & Rehabilitation  Progress Note    Patient Identification:  Sherwin Simental  [id number]  : 1940  Admit date: 2023    Chief Complaint: Acute ischemic stroke ([address]    Subjective:   No acute events overnight. Patient seen this am sitting up in room, finishing SLP session. Patient initially denies concerns, then SLP prompted her to tell me about headache. She has been having intermittent right side and periorbital headache since admission. She does have some associated nasal/sinus congestion. Acetaminophen has been helpful, encouraged her to ask RN for this when she is experiencing symptoms. Labs reviewed. ROS: No f/c, n/v, cp     Objective:  Patient Vitals for the past 24 hrs:   BP Temp Temp src Pulse Resp SpO2 Weight   05/10/23 0901 (!) 128/46 -- -- 68 -- -- --   05/10/23 0430 (!) 172/66 97.4 °F (36.3 °C) Oral 58 16 98 % 111 lb 15.9 oz (50.8 kg)   23 (!) 182/70 97.3 °F (36.3 °C) Oral 71 18 97 % --   23 1700 (!) 168/69 97.9 °F (36.6 °C) Oral 72 16 95 % --     Const: Alert. No distress, pleasant. HEENT: Normocephalic, atraumatic. Normal sclera/conjunctiva. MMM. CV: Regular rate and rhythm. Resp: No respiratory distress. Lungs CTAB. Abd: Soft, nontender, nondistended, NABS+   Ext: No edema. Neuro: Alert, oriented but with impaired recall. Right facial droop and hemiparesis (3/5 RUE, 4/5 RLE)  Psych: Cooperative, appropriate mood and affect    Laboratory data: Available via EMR. Last 24 hour lab  No results found for this or any previous visit (from the past 24 hour(s)).       Therapy progress:  Physical therapy:  Bed Mobility:  Overall Assistance Level: Stand By Assist  Additional Factors: Head of bed flat, Increased time to complete, Without handrails  Sit>supine:  Assistance Level: Stand by assist  Skilled Clinical Factors: increased time  Supine>sit:  Assistance Level: Stand by assist  Skilled Clinical Factors: increased time and verbal
Occupational Therapy  Facility/Department: [facility]  Rehabilitation Occupational Therapy Daily Treatment Note    Date: 23  Patient Name: Vishal Suazo       Room: K4O-0511/7323-45  MRN: [id number]  Account: [de-identified]   : 1940  (78 y.o.) Gender: female                    Past Medical History:  has a past medical history of Anxiety, Benign essential HTN, Breast cancer ([facility] Cancer ([facility] HLD (hyperlipidemia), Thyroid disease, and Vitamin D deficiency. Past Surgical History:   has a past surgical history that includes Breast surgery and Hysterectomy. Restrictions  Restrictions/Precautions: Fall Risk  Other position/activity restrictions: No no sleeve left UE due to history of mastectomy, IV right wrist  Equipment Used: Bed, Other (recliner)    Subjective  Subjective: pt met in dept, agreeable to OT  Restrictions/Precautions: Fall Risk             Objective     Cognition  Overall Cognitive Status: Exceptions  Attention Span: Difficulty dividing attention  Memory: Decreased short term memory  Safety Judgement: Decreased awareness of need for safety  Problem Solving: Assistance required to generate solutions;Assistance required to implement solutions  Insights: Decreased awareness of deficits  Cognition Comment: pt is anxious, repeats self, worried about discharge. Very distractable  Orientation  Overall Orientation Status: Within Functional Limits                      Functional Mobility  Device: Rolling walker  Assistance Level: Stand by assist  Skilled Clinical Factors: improving with use of right hand on walker, still drags right foot at times, cues for safety with turns to sit  Transfers  Surface: To chair with arms; Wheelchair;From chair with arms  Additional Factors: Verbal cues; Set-up; Hand placement cues  Device: Walker  Sit to Stand  Assistance Level: Stand by assist  Stand to Sit  Assistance Level: Stand by assist  Bed To/From Chair  Assistance Level: Stand by assist
Occupational Therapy  Facility/Department: [facility]  Rehabilitation Occupational Therapy Daily Treatment Note    Date: 5/10/23  Patient Name: Aleta Sim       Room: Z4K-9801  MRN: [id number]  Account: [de-identified]   : 1940  (78 y.o.) Gender: female                    Past Medical History:  has a past medical history of Anxiety, Benign essential HTN, Breast cancer ([facility] [address] Cancer ([facility] [address] HLD (hyperlipidemia), Thyroid disease, and Vitamin D deficiency. Past Surgical History:   has a past surgical history that includes Breast surgery and Hysterectomy. Restrictions  Restrictions/Precautions: Fall Risk  Other position/activity restrictions: No no sleeve left UE due to history of mastectomy, IV right wrist  Equipment Used: Bed, Other (recliner)    Subjective  Subjective: pt met bedside agreeable to OT, stated she is tired, but OK for shower  Restrictions/Precautions: Fall Risk             Objective     Cognition  Overall Cognitive Status: Exceptions  Memory: Decreased short term memory  Safety Judgement: Decreased awareness of need for safety  Problem Solving: Assistance required to generate solutions;Assistance required to implement solutions  Insights: Decreased awareness of deficits  Initiation: Requires cues for some  Sequencing: Requires cues for some  Cognition Comment: pt is anxious, repeats self, worried about discharge  Orientation  Overall Orientation Status: Within Functional Limits  Orientation Level: Oriented X4         ADL  Grooming/Oral Hygiene  Assistance Level: Stand by assist  Skilled Clinical Factors: standing at sink for oral care. able to place toothpaste on brush per self, spit into sink vs counter today. Combed hair per self  Upper Extremity Bathing  Assistance Level: Stand by assist;Verbal cues; Set-up  Skilled Clinical Factors: completed UB per self, used right hand to assist more frequently today  Lower Extremity Bathing  Assistance Level: Contact guard
Occupational Therapy  Facility/Department: [facility]  Rehabilitation Occupational Therapy Daily Treatment Note    Date: 5/15/23  Patient Name: Eliza Argueta       Room: M8D-1832/4773-54  MRN: [id number]  Account: [de-identified]   : 1940  (78 y.o.) Gender: female                    Past Medical History:  has a past medical history of Anxiety, Benign essential HTN, Breast cancer ([facility] [address] Cancer ([facility] [address] HLD (hyperlipidemia), Thyroid disease, and Vitamin D deficiency. Past Surgical History:   has a past surgical history that includes Breast surgery and Hysterectomy. Restrictions  Restrictions/Precautions: Fall Risk  Other position/activity restrictions: No no sleeve left UE due to history of mastectomy, IV right wrist  Equipment Used: Bed, Other (recliner)    Subjective  Subjective: pt met bedside, seated in wheelchair agreeable to OT  Restrictions/Precautions: Fall Risk             Objective     Cognition  Overall Cognitive Status: Exceptions  Attention Span: Difficulty dividing attention  Memory: Decreased short term memory  Safety Judgement: Decreased awareness of need for safety  Problem Solving: Assistance required to generate solutions;Assistance required to implement solutions  Insights: Decreased awareness of deficits  Cognition Comment: pt is anxious, repeats self, worried about discharge. Very distractable  Orientation  Overall Orientation Status: Within Functional Limits         ADL  Grooming/Oral Hygiene  Assistance Level: Supervision  Skilled Clinical Factors: standing at sink for oral care. able to place toothpaste on brush per self, spit into sink   Combed hair per self  Upper Extremity Bathing  Assistance Level: Stand by assist;Verbal cues; Set-up  Skilled Clinical Factors: completed UB per self, used right hand to assist bathing LUE including axilla  Lower Extremity Bathing  Assistance Level: Stand by assist  Skilled Clinical Factors: crossed legs to reach feet wiht cues, stood with
Occupational Therapy  Facility/Department: [facility]  Rehabilitation Occupational Therapy Daily Treatment Note    Date: 23  Patient Name: Ratna Mcpherson       Room: Y7W-9907/7453-19  MRN: [id number]  Account: [de-identified]   : 1940  (78 y.o.) Gender: female                    Past Medical History:  has a past medical history of Anxiety, Benign essential HTN, Breast cancer ([facility] [address] Cancer ([facility] [address] HLD (hyperlipidemia), Thyroid disease, and Vitamin D deficiency. Past Surgical History:   has a past surgical history that includes Breast surgery and Hysterectomy. Restrictions  Restrictions/Precautions: Fall Risk  Other position/activity restrictions: No no sleeve left UE due to history of mastectomy, IV right wrist  Equipment Used: Bed, Other (recliner)    Subjective  Subjective: pt met bedside, finishing toileting with nursing  Restrictions/Precautions: Fall Risk             Objective     Cognition  Overall Cognitive Status: Exceptions  Memory: Decreased short term memory  Safety Judgement: Decreased awareness of need for safety  Problem Solving: Assistance required to generate solutions;Assistance required to implement solutions  Insights: Decreased awareness of deficits  Initiation: Requires cues for some  Sequencing: Requires cues for some  Cognition Comment: pt is anxious, repeats self  Orientation  Overall Orientation Status: Within Functional Limits  Orientation Level: Oriented X4   Perception  Overall Perceptual Status: Impaired  Unilateral Attention: Cues to attend to right side of body                      Neuromuscular Education  Neuromuscular education: Yes  Functional Movement Patterns: pt provided with yellow putty and written exercises. She had difficulty following directions using right hand, kept saying \"I cant\" but actually has the strength to squeeze the putty, roll and complete opposition with fair motion. Initiated writing exercises, pencil with triangular .   She was able
Occupational Therapy  Facility/Department: [facility]  Rehabilitation Occupational Therapy Daily Treatment Note    Date: 23  Patient Name: Cynthia Chávez       Room: U9T-5112/7996-06  MRN: [id number]  Account: [de-identified]   : 1940  (78 y.o.) Gender: female                    Past Medical History:  has a past medical history of Anxiety, Benign essential HTN, Breast cancer ([facility] [address] Cancer ([facility] [address] HLD (hyperlipidemia), Thyroid disease, and Vitamin D deficiency. Past Surgical History:   has a past surgical history that includes Breast surgery and Hysterectomy. Restrictions  Restrictions/Precautions: Fall Risk  Other position/activity restrictions: No no sleeve left UE due to history of mastectomy, IV right wrist  Equipment Used: Bed, Other (recliner)    Subjective  Subjective: pt met in dept after PT, agreeable to OT  Restrictions/Precautions: Fall Risk             Objective     Cognition  Overall Cognitive Status: Exceptions  Memory: Decreased short term memory  Safety Judgement: Decreased awareness of need for safety  Problem Solving: Assistance required to generate solutions;Assistance required to implement solutions  Insights: Decreased awareness of deficits  Initiation: Requires cues for some  Sequencing: Requires cues for some  Cognition Comment: pt is anxious, repeats self, worried about discharge  Orientation  Overall Orientation Status: Within Functional Limits  Orientation Level: Oriented X4   Perception  Overall Perceptual Status: Impaired  Unilateral Attention: Cues to attend to right side of body                  Functional Mobility  Device: Rolling walker  Assistance Level: Stand by assist;Contact guard assist  Skilled Clinical Factors: approx 15 ft wheelchair to recliner with SBA/CGA using rolling walker - balance is improving, but she still lacks confidence with mobility. Pt stated she had been having falls at home, but does not know why.   She does not feel confident in her apt
Occupational Therapy  Facility/Department: [facility]  Rehabilitation Occupational Therapy Daily Treatment Note    Date: 23  Patient Name: Donna Herrera       Room: X6H-5033/1888-41  MRN: [id number]  Account: [de-identified]   : 1940  (78 y.o.) Gender: female                    Past Medical History:  has a past medical history of Anxiety, Benign essential HTN, Breast cancer ([facility] [address] Cancer ([facility] [address] HLD (hyperlipidemia), Thyroid disease, and Vitamin D deficiency. Past Surgical History:   has a past surgical history that includes Breast surgery and Hysterectomy. Restrictions  Restrictions/Precautions: Fall Risk  Other position/activity restrictions: No no sleeve left UE due to history of mastectomy, IV right wrist  Equipment Used: Bed, Other (recliner)    Subjective  Subjective: pt met bedside, agreeable to OT, resting in recliner  Restrictions/Precautions: Fall Risk             Objective     Cognition  Overall Cognitive Status: Exceptions  Attention Span: Difficulty dividing attention  Memory: Decreased short term memory  Safety Judgement: Decreased awareness of need for safety  Problem Solving: Assistance required to generate solutions;Assistance required to implement solutions  Insights: Decreased awareness of deficits  Initiation: Requires cues for some  Sequencing: Requires cues for some  Cognition Comment: pt is anxious, repeats self, worried about discharge  Orientation  Overall Orientation Status: Within Functional Limits  Orientation Level: Oriented X4         ADL  Grooming/Oral Hygiene  Assistance Level: Stand by assist  Skilled Clinical Factors: standing at sink for oral care. able to place toothpaste on brush per self, spit into sink   Combed hair per self  Upper Extremity Bathing  Assistance Level: Stand by assist;Verbal cues; Set-up  Skilled Clinical Factors: completed UB per self, used right hand to assist bathing LUE including axilla  Lower Extremity Bathing  Assistance Level:
PHYSICAL THERAPY  Progress Note   Second Session    Patient Name: Katie Michelle  Medical Record Number: [id number]    Treatment Diagnosis: impaired functional mobility following acute ischemic infarct in the left basal ganglia with extension to the corona radiata      Chart Reviewed: Yes   Restrictions/Precautions: Fall Risk Other position/activity restrictions: No no sleeve left UE due to history of mastectomy, IV right wrist   Additional Pertinent Hx: Per Dr. Harrison Wolff H&P, \"77 yo F with pmh HTN, HLD, breast cancer, thyroid disease, and anxiety who initially presented to [address] [place] [address] on 5/2/2023 with fluctuating right side weakness x 3 days. Imaging revealed acute ischemic infarct in the left basal ganglia with extension to the corona radiata. Course complicated by uncontrolled HTN, elevated troponin. Now presents to ARU with impaired mobility, self-care, and cognition/communication below her baseline. Currently, she reports ongoing right side weakness, slurred speech. She has some pain and abnormal sensation over right head/face and right hand. She feels her vision is not as clear as usual. She denies dizziness, difficulty with swallowing, or memory. \" Had a vasovagal response on commode on Saturday 5/6/2023 and rapid response was called        Subjective: Pt in bed upon arrival.  Reports fatigue but agreeable to PT treatment. No c/o pain. Two friends present (Bela and Gabrielle?) during session and Estradajuanis Grijalva reports she will also be present for family ed on Monday at 1pm.      Objective:  Supine to sit SBA. Stand pivot transfer with RW bed to w/c with CGA and cues for hand placement. Transported to therapy gym via w/c. Pt ambulated approx 160' with RW with dycem R  with CGA and cues to increase R foot clearance with fatigue. Compensates with steppage gait. Pt ambulated short distances in therapy gym with RW and CGA. 6\" curb step with RW and Boom for walker management only.   Cues for
PHYSICAL THERAPY  Progress Note   Second Session    Patient Name: Larry Levin  Medical Record Number: [id number]    Treatment Diagnosis: impaired functional mobility following acute ischemic infarct in the left basal ganglia with extension to the corona radiata      Chart Reviewed: Yes   Restrictions/Precautions: Fall Risk Other position/activity restrictions: No no sleeve left UE due to history of mastectomy, IV right wrist   Additional Pertinent Hx: Per Dr. Gael Lucas H&P, \"79 yo F with pmh HTN, HLD, breast cancer, thyroid disease, and anxiety who initially presented to [address] [place] [address] on 5/2/2023 with fluctuating right side weakness x 3 days. Imaging revealed acute ischemic infarct in the left basal ganglia with extension to the corona radiata. Course complicated by uncontrolled HTN, elevated troponin. Now presents to ARU with impaired mobility, self-care, and cognition/communication below her baseline. Currently, she reports ongoing right side weakness, slurred speech. She has some pain and abnormal sensation over right head/face and right hand. She feels her vision is not as clear as usual. She denies dizziness, difficulty with swallowing, or memory. \" Had a vasovagal response on commode on Saturday 5/6/2023 and rapid response was called        Subjective: Pt pleasant and agreeable to PT treatment. Denies pain but reports fatigue. Objective:  Seated exercises: x10 LAQs beronica, x10 alternating marches, x20 heel raises/toe raises beronica  Pt ambulated 90'x2 with RW and firm CGA. Pt fatigued and attempting to rush at times and required cues to slow down. Decreased R foot clearance and step length and compensates with steppage gait when cues for increased foot clearance. NuStep BUEs and BLEs, resistance level 2 for 5 min with dycem for  assist R hand and occasional cuing to increase R ; time limited by fatigue and pt required encouragement to continue.   Pt ambulated short distances in therapy
Patient admitted to rehab with Acute Ischemic stroke. A/Ox4, confused at times. Transfers with Corry martinez X 1. Mobility restrictions: WBAT. On Regular diet, tolerating well. Medications takes whole with thin liquids. On Lovenox for DVT prophylaxis. Oxygen: on room air. Has been continent of bowel and bladder. LBM 5/6. Chair/bed alarms in use and call light in reach. Trish-camera in use  for safety. Will continue to monitor.
Patient admitted to rehab with Acute Ischemic stroke. A/Ox4. Transfers with walker. Mobility restrictions: WBAT. On regukar diet, tolerating well. Medications taken whole. On lovenox for DVT prophylaxis. Skin: Mastectomy left breast; nono sleeve. Oxygen: room air. Has been continent of bowel and continent of bladder. LBM 5/17. Chair/bed alarms in use and call light in reach. Will monitor for safety.  Electronically signed by Jose Gaviria LPN on 9/32/9752 at 1:65 PM
Patient admitted to rehab with acute ischemic stroke. A/Ox4 with periods of forgetfulness. Transfers with no device, ambulates with RWx1. Mobility restrictions: WBAT. On Regular diet, tolerating well. Medications taken whole. On Lovenox for DVT prophylaxis. Skin: intact. Oxygen: room air. LDA: R wrist PIV. Has been continent of bowel and bladder. LBM 5/15. Chair/bed alarms in use and call light in reach. Will monitor for safety. For Patient Safety Visual Surveillance in place, reviewed with patient/family.  Electronically signed by Eleni Cross RN, [address] [place] [address] on 5/15/23 at 5:55 PM EDT
Patient admitted to rehab with acute ischemic stroke. A/Ox4, forgetful, requires AVACAM for impulsivity, gets a little confused at night. Transfers with stedy x 1. Mobility restrictions: WBAT. On regular diet, tolerating well. Medications taken whole with thin liquids. On lovenox for DVT prophylaxis. Skin: abrasion to right knee. Oxygen: RA. LDA: PIV RFA. Has been continent of bowel and bladder. LBM 5/6/23. Chair/bed alarms in use and call light in reach. Will monitor for safety.
Patient admitted to rehab with acute ischemic stroke. A/Ox4, forgetful. Transfers with stedy. Mobility restrictions: WBAT. On regular diet, tolerating well. Medications taken whole with thin liquids. On lovenox for DVT prophylaxis. Skin: abrasion right knee. Oxygen: RA. LDA: none. Has been continent of bowel and continent of bladder. LBM 5/6/23. Chair/bed alarms in use and call light in reach. Will monitor for safety.  Electronically signed by Glenroy Marcos RN on 5/8/2023 at 9:54 AM
Patient admitted to rehab with acute ischemic stroke. A/Ox4, forgetful. Transfers with walker. Mobility restrictions: WBAT. On regular diet, tolerating well. Medications taken whole with thins. On lovenox for DVT prophylaxis. Skin: abrasion right knee; cyst to left hand. Oxygen: RA. LDA: PIV RFA. Has been continent of bowel and continent of bladder. LBM 5/12/23. Chair/bed alarms in use and call light in reach. Will monitor for safety.  Electronically signed by Braulio Yuen RN on 5/12/2023 at 2:34 PM
Patient admitted to rehab with acute ischemic stroke. A/Ox4. Transfers with no device, ambulates with RW. Mobility restrictions: WBAT. On regular diet, tolerating well. Medications taken whole. On Lovenox for DVT prophylaxis. Skin: abrasion to right knee. Oxygen: room air. LDA: Right FA PIV remains patent. Has been continent of bowel and  bladder. LBM 5/6. Chair/bed alarms in use and call light in reach. Will monitor for safety.  Electronically signed by Martin Martinez RN, [address] [place] [address] on 5/10/23 at 6:09 PM EDT
Patient admitted to rehab with stroke. A/Ox4  forgetful at times. Transfers with walker x1 assist with gait belt. Mobility restrictions: WBAT . On Regular diet, tolerating well. Medications taken whole with thin liquids. On Lovenox for DVT prophylaxis. 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 Oxygen: RA. LDA: PIV, LFA. Has been continent of bladder. LBM 5/6/2023, senokot administered. Chair/bed alarms in use and call light in reach. Will monitor for safety.  Electronically signed by Jaimee Gallego RN on 5/9/2023 at 7:46 PM
Patient admitted to rehab with stroke. A/Ox4  forgetful at times. Transfers with walker x1 assist with gait belt. Mobility restrictions: WBAT. On Regular diet, tolerating well. Medications taken whole with thin liquids. On Lovenox for DVT prophylaxis. Skin: abrasion to right knee, mastectomy left breast-nono sleeve in place, cyst/lymphoma to left thumb. Oxygen: RA. LDA: PIV, LFA. Has been continent of bladder. LBM 5/6/2023, senokot and Miralax administered today. Chair/bed alarms in use and call light in reach. Will monitor for safety.  Electronically signed by Chris Bennett RN on 5/11/2023 at 2:18 PM
Patient has been discharged per MD orders. Patient verbalizes understanding of all instructions, medications, and follow up. All belongings have been gathered and packed. Family at bedside.    Electronically signed by Irwin Delatorre RN on 5/18/2023 at 3:39 PM
Patient is an 78 y.o. female admitted to rehab with an acute ischemic stroke. Patient is A&O x 4, forgetful at times. Transfers with a front wheel walker with gait belt x 1 assist. Mobility restrictions: WBAT. On a regular diet, tolerating well. Medications taken whole with thin liquids. On Lovenox for DVT prophylaxis. Skin: abrasion to right knee, previous left breast mastectomy, cyst to area between left thumb & index. Oxygen: none, tolerating well. LDA: PIV LFA. Has been continent of bowel and bladder. LBM 5/6/23. Chair/bed alarms in use and call light in reach. Will monitor for safety.  Electronically signed by Vandana Rhodes RN on 5/10/2023 at 2:24 AM
Patient is an 78 y.o. female admitted to rehab with an acute ischemic stroke. Patient is A&O x 4. Transfers with a front wheel walker with gait belt x 1 assist. Mobility restrictions: WBAT. On a regular diet, tolerating well. Medications taken whole with thin liquids. On Lovenox for DVT prophylaxis. Skin: abrasion to right knee, previous left breast mastectomy, cyst to area between left thumb & index. Oxygen: none, tolerating well. LDA: PIV LFA. Has been continent of bowel and bladder. LBM 5/17/23. Chair/bed alarms in use and call light in reach. Will monitor for safety.  Electronically signed by Abel Sena RN on 5/17/2023 at 11:17 PM
Patient was admitted to Rehab with Acute ischemic stroke. Patient is alert and oriented x4. Respirations easy,Lungs clear,on room air. Denies having chest pain/discomfort,denies having SOB. On Regular Diet. Medications taken whole with thin liquids,tolerating well. Receives Lovenox for DVT prophylaxis . Patient has been Continent of bowel and bladder. Skin: abrasion to right knee. Transfers using Gait belt and walker  x 1assist.Tolerates well. Safety precautions remains in place,call light in reach,bed alarm activated,will continue to monitor.
Pharmacy Medication Reconciliation Note     List of medications patient is currently taking is complete. Source of information:   1. [facility] discharge instructions      Notes regarding home medications:   1. Added Coreg,Lisinopril,Oscal,Vitamin D, Ocean,Tylenol,& Loratadine  2.  Removed Mucinex,Nolvadex,Sudafed, & Motrin    Denies taking any other OTC or herbal medications    Jignesh Pérez, [facility], [address] 5/8/2023 3:12 PM
Physical Therapy  Facility/Department: [address] REHAB  Rehabilitation Physical Therapy Treatment Note/Discharge Summary    NAME: Concepción Gasca  : 1940 (78 y.o.)  MRN: [id number]  CODE STATUS: Full Code    Date of Service: 23       Restrictions:  Restrictions/Precautions: Fall Risk  Position Activity Restriction  Other position/activity restrictions: No no sleeve left UE due to history of mastectomy, IV right wrist     Pertinent medical information:  Additional Pertinent Hx: Per Dr. Sheyla Kinsey H&P, \"79 yo F with pmh HTN, HLD, breast cancer, thyroid disease, and anxiety who initially presented to [address] [place] [address] on 2023 with fluctuating right side weakness x 3 days. Imaging revealed acute ischemic infarct in the left basal ganglia with extension to the corona radiata. Course complicated by uncontrolled HTN, elevated troponin. Now presents to ARU with impaired mobility, self-care, and cognition/communication below her baseline. Currently, she reports ongoing right side weakness, slurred speech. She has some pain and abnormal sensation over right head/face and right hand. She feels her vision is not as clear as usual. She denies dizziness, difficulty with swallowing, or memory. \" Had a vasovagal response on commode on 2023 and rapid response was called    SUBJECTIVE  Subjective  Subjective: patient arrived in wheelchair. She reports she slept well but she is feeling nauseous this AM. She reports that it may be due to her nerves  Pain: none reported    Social/Functional History  Lives With: Alone  Type of Home: Apartment  Home Layout: One level  Home Access: Elevator (elevator close to her apartment is broken and patient  walks to the other elevator on the other side of the building, but patient reports it is a long walk. There are stairs with 2 rails too far apart to use together.  3rd floor apartment)  Bathroom Shower/Tub: Curtain, Tub/Shower unit  Bathroom Toilet:
Physical Therapy  Facility/Department: [address] REHAB  Rehabilitation Physical Therapy Treatment Note    NAME: Carlitos Mary  : 1940 (78 y.o.)  MRN: [id number]  CODE STATUS: Full Code    Date of Service: 5/10/23       Restrictions:  Restrictions/Precautions: Fall Risk  Position Activity Restriction  Other position/activity restrictions: No no sleeve left UE due to history of mastectomy, IV right wrist     Pertinent medical information:  Additional Pertinent Hx: Per Dr. Last Hassan H&P, \"77 yo F with pmh HTN, HLD, breast cancer, thyroid disease, and anxiety who initially presented to [address] [place] [address] on 2023 with fluctuating right side weakness x 3 days. Imaging revealed acute ischemic infarct in the left basal ganglia with extension to the corona radiata. Course complicated by uncontrolled HTN, elevated troponin. Now presents to ARU with impaired mobility, self-care, and cognition/communication below her baseline. Currently, she reports ongoing right side weakness, slurred speech. She has some pain and abnormal sensation over right head/face and right hand. She feels her vision is not as clear as usual. She denies dizziness, difficulty with swallowing, or memory. \" Had a vasovagal response on commode on 2023 and rapid response was called    SUBJECTIVE  Subjective  Subjective: Patient arrived in wheelchair following speech session. SLP encouraged patient to use a louder voice and use good breath support during all therapy session. Patient continues to report she feels very fatigued and like she could sleep all day. Patient is also concerned about discharge, afraid of where she is going to go.   Pain: none reported    Social/Functional History  Lives With: Alone  Type of Home: Apartment  Home Layout: One level  Home Access: Elevator (elevator close to her apartment is broken and patient  walks to the other elevator on the other side of the building, but patient reports it is a long
Physical Therapy  Facility/Department: [address] REHAB  Rehabilitation Physical Therapy Treatment Note  - AM and PM Sessions      NAME: Donna Herrera  : 1940 (78 y.o.)  MRN: [id number]  CODE STATUS: Full Code    Date of Service: 23       Restrictions:  Restrictions/Precautions: Fall Risk  Position Activity Restriction  Other position/activity restrictions: No no sleeve left UE due to history of mastectomy, IV right wrist     Pertinent medical information:  Additional Pertinent Hx: Per Dr. Ceci Ordonez H&P, \"79 yo F with pmh HTN, HLD, breast cancer, thyroid disease, and anxiety who initially presented to [address] [place] [address] on 2023 with fluctuating right side weakness x 3 days. Imaging revealed acute ischemic infarct in the left basal ganglia with extension to the corona radiata. Course complicated by uncontrolled HTN, elevated troponin. Now presents to ARU with impaired mobility, self-care, and cognition/communication below her baseline. Currently, she reports ongoing right side weakness, slurred speech. She has some pain and abnormal sensation over right head/face and right hand. She feels her vision is not as clear as usual. She denies dizziness, difficulty with swallowing, or memory. \" Had a vasovagal response on commode on 2023 and rapid response was called    Social/Functional History  Lives With: Alone  Type of Home: Apartment  Home Layout: One level  Home Access: Elevator (elevator close to her apartment is broken and patient  walks to the other elevator on the other side of the building, but patient reports it is a long walk. There are stairs with 2 rails too far apart to use together.  3rd floor apartment)  Bathroom Shower/Tub: Curtain, Tub/Shower unit  Bathroom Toilet: Standard  Bathroom Equipment: Grab bars in shower  Home Equipment: Walker, rolling  Has the patient had two or more falls in the past year or any fall with injury in the past year?: Yes  Receives Help
Physical Therapy  Facility/Department: [address] REHAB  Rehabilitation Physical Therapy Treatment Note    NAME: Queen Giulia  : 1940 (78 y.o.)  MRN: [id number]  CODE STATUS: Full Code    Date of Service: 23       Restrictions:  Restrictions/Precautions: Fall Risk  Position Activity Restriction  Other position/activity restrictions: No no sleeve left UE due to history of mastectomy, IV right wrist     Pertinent medical information:  Additional Pertinent Hx: Per Dr. Kourtney Campbell H&P, \"79 yo F with pmh HTN, HLD, breast cancer, thyroid disease, and anxiety who initially presented to [address] [place] [address] on 2023 with fluctuating right side weakness x 3 days. Imaging revealed acute ischemic infarct in the left basal ganglia with extension to the corona radiata. Course complicated by uncontrolled HTN, elevated troponin. Now presents to ARU with impaired mobility, self-care, and cognition/communication below her baseline. Currently, she reports ongoing right side weakness, slurred speech. She has some pain and abnormal sensation over right head/face and right hand. She feels her vision is not as clear as usual. She denies dizziness, difficulty with swallowing, or memory. \" Had a vasovagal response on commode on 2023 and rapid response was called    SUBJECTIVE  Subjective  Subjective: patient arrived in wheelchair. She reports she slept well but she continues to feel very fatigued  Pain: none reported    Social/Functional History  Lives With: Alone  Type of Home: Apartment  Home Layout: One level  Home Access: Elevator (elevator close to her apartment is broken and patient  walks to the other elevator on the other side of the building, but patient reports it is a long walk. There are stairs with 2 rails too far apart to use together.  3rd floor apartment)  Bathroom Shower/Tub: Curtain, Tub/Shower unit  Bathroom Toilet: Standard  Bathroom Equipment: Grab bars in shower  Home Equipment:
Physical Therapy  Facility/Department: [facility]  Rehabilitation Physical Therapy Treatment Note    NAME: Wendy Velasco  : 1940 (78 y.o.)  MRN: [id number]  CODE STATUS: Full Code    Date of Service: 5/15/23       Restrictions:  Restrictions/Precautions: Fall Risk  Position Activity Restriction  Other position/activity restrictions: No no sleeve left UE due to history of mastectomy, IV right wrist     Pertinent medical information:  Additional Pertinent Hx: Per Dr. Huyen Genao H&P, \"79 yo F with pmh HTN, HLD, breast cancer, thyroid disease, and anxiety who initially presented to [address] [place] [address] on 2023 with fluctuating right side weakness x 3 days. Imaging revealed acute ischemic infarct in the left basal ganglia with extension to the corona radiata. Course complicated by uncontrolled HTN, elevated troponin. Now presents to ARU with impaired mobility, self-care, and cognition/communication below her baseline. Currently, she reports ongoing right side weakness, slurred speech. She has some pain and abnormal sensation over right head/face and right hand. She feels her vision is not as clear as usual. She denies dizziness, difficulty with swallowing, or memory. \" Had a vasovagal response on commode on 2023 and rapid response was called    SUBJECTIVE  Subjective  Subjective: patient arrived in wheelchair. She reports she did not get much rest this weekend as she could not sleep well. She continues to report considerable fatigue that limits her  Pain: She reports some back pain this AM, but does state she laid in bed a lot and worried about everything this weekend    Social/Functional History  Lives With: Alone  Type of Home: [facility] [address]: One level  Home Access: Elevator (elevator close to her apartment is broken and patient  walks to the other elevator on the other side of the building, but patient reports it is a long walk.  There are stairs with 2 rails too far apart
Physical Therapy  Facility/Department: [facility]  Rehabilitation Physical Therapy Initial Assessment    NAME: Aj Garcia  : 1940 (89 y.o.)  MRN: [id number]  CODE STATUS: Full Code    Date of Service: 23      Past Medical History:   Diagnosis Date    Anxiety     Benign essential HTN     Breast cancer ([facility] [address]     Cancer ([facility] [address]     HLD (hyperlipidemia)     Thyroid disease     Vitamin D deficiency      Past Surgical History:   Procedure Laterality Date    BREAST SURGERY      HYSTERECTOMY (CERVIX STATUS UNKNOWN)         Chart Reviewed: Yes  Additional Pertinent Hx: Per Dr. Bradly Hunter H&P, \"77 yo F with pmh HTN, HLD, breast cancer, thyroid disease, and anxiety who initially presented to [address] [place] [address] on 2023 with fluctuating right side weakness x 3 days. Imaging revealed acute ischemic infarct in the left basal ganglia with extension to the corona radiata. Course complicated by uncontrolled HTN, elevated troponin. Now presents to ARU with impaired mobility, self-care, and cognition/communication below her baseline. Currently, she reports ongoing right side weakness, slurred speech. She has some pain and abnormal sensation over right head/face and right hand. She feels her vision is not as clear as usual. She denies dizziness, difficulty with swallowing, or memory. \" Had a vasovagal response on commode on 2023 and rapid response was called  Family / Caregiver Present: No  Referring Practitioner: Dr. Phillip Schuler  Referral Date : 23  Diagnosis: CVA    Restrictions:  Restrictions/Precautions: Fall Risk  Position Activity Restriction  Other position/activity restrictions: No no sleeve left UE due to history of mastectomy     SUBJECTIVE  Subjective: Patient arrived in wheelchair and has no complaints.  She reports she hasn't been sleeping great        Prior Level of Function:  Social/Functional History  Lives With: Alone  Type of Home: Apartment  Home Layout: One level  Home
Pt evening shift assessment complete. VSS and medication given as ordered. Pt assessed for comfort needs, assisted to the bathroom and back to bed and given xanax per pt request.  Pt does not express any additional needs at this time, resting comfortably in bed.
This is a 78 y.o.  female admitted on 5/5/2023 with Acute ischemic stroke ([facility] [address] [I63.9]. A&O X 4. Vital signs stable except blood pressure that is elevated. Active bowel sounds. Last BM 5/12/2023. Patient is continent of bowel & bladder. Loral Golds in place at this time. Patient is on a regular diet. Medications taken whole with thin liquid. Transfers with walker x 1. On Lovenox to prevent DVT. HS medication given. Tolerated well. Call light and bedside table within reach. Patient instructed to call if she is in need of anything. She uses the call light a lot.
This is a 78 y.o.  female admitted on 5/5/2023 with Acute ischemic stroke ([facility] [address] [I63.9]. A&O X 4. Vital signs stable except blood pressure that is elevated. Active bowel sounds. Last BM 5/12/2023. Patient is continent of bowel & bladder. Patient is on a regular diet. Medications taken whole with thin liquid. Transfers with walker x 1. On Lovenox to prevent DVT. HS medication given. Tolerated well. Call light and bedside table within reach. Patient instructed to call if she is in need of anything. She uses the call light a lot.
This is a 78 y.o.  female admitted on 5/5/2023 with Acute ischemic stroke ([facility] [address] [I63.9]. A&O X 4. Vital signs stable but blood pressure that is still elevated. Active bowel sounds. Last BM 5/17/2023. Patient is continent of bowel & bladder. Patient is on a regular diet. Medications taken whole with thin liquid. Transfers with walker x 1. On Lovenox to prevent DVT. HS medication given. Tolerated well. Call light and bedside table within reach. Patient instructed to call if she is in need of anything.
This is a 78 y.o.  female admitted on 5/5/2023 with Acute ischemic stroke. Pt A&O X4. HR regular. Lungs sounds diminished. Denies any chest pain or shortness of breath. Abd soft and nontender. Active bowel sounds. Last BM 5/17/23. Continent of bowel & bladder. Depends on. Denies any abd discomfort. Transfers with walker X1. On regular diet, tolerating well. Medications taken whole with thins. Tolerated well. Call light in reach. Patient instructed to call if there is any needs or changes.   Electronically signed by Hunter Orozco RN on 5/18/2023 at 9:00 AM
#: [id number]  Date: 5/10/2023       Current functional status (updated daily):         Current Diet Order:ADULT DIET; Regular   Behavior: [x] Alert  [x] Cooperative  [x]  Pleasant  [] Confused  [] Agitated  [] Uncooperative  [] Distractible [] Motivated  [] Self-Limiting [] Anxious  [] Other:  Endurance:  [x] Adequate for participation in SLP sessions  [] Reduced overall  [] Lethargic  [] Other:  Safety: [] No concerns at this time  [] Reduced insight into deficits  []  Reduced safety awareness [] Not following call light procedures  [] Unable to Assess  [] Other:  Barriers toward progress: potential barriers;medical issues and/or caregiver support           Date: 5/10/2023      Tx session 1 Tx session 2   Total Timed Code Min SLP Individual Minutes  Time TA:0835  Time Out: 0945  Minutes: 30  Coded treatment time  15 SLP Individual Minutes  Time In:1140  Time Out: 1156  Minutes:   Coded treatment time  16   Group Treatment Minutes 0 0   Co-Treat Minutes 0 0   Variance/Reason:  N/a N/a   Pain Denied just reported 'tired' Right forehead/eye pain \"uncomfortable\"   Pain Intervention [] RN notified  [] Repositioned  [] Intervention offered and patient declined  [] N/A  [] Other: [] RN notified  [] Repositioned  [] Intervention offered and patient declined  [] N/A  [] Other:   Subjective     Pt seen in treatment office  Flat affect but pt reporting tired today  Good effort Pt seen bedside  PT in bed eating lunch, reclined ~40 degrees. Repositioned  Pt compoainting of headache pain \"uncomrfortable\" right forehead / eye   Objective:  Goals     OM Speech and Cognitive-Linguistic Goals: Patient Goal: to return home with improved recall and speech clarity        Therapy working toward pt goals N/a         Goal 1: Patient will improve conversational speech intelligibiltiy to >90% via improved R OM strength/coordination, improved vocal quality, and compensatory speech strategy carryover with min cues.  Right facial
retraction/protrusion/rounding but hard to sustain for >3 second duration and for 15/15 reps with verbal cues for big stretch  Pt achieving right labial/buccal oral motor muscular engagement against resistance: x 15 reps for 2-3 second duration each  Good lingual rom    OM speech:   Pt achieving miild to moderate right om labial/buccal rom during structured speech tasks emphasizing big mouth movements. 100% intelliible and >93% articulatory precision  Spontaneous moderate right om muscular engagement    Less structured tasks:   Continued emphasis on the following phonemes ('sh'; 'ch' /dg/; /s/; /z/ at multiple word utterance level in all positions of words  100% intelligibility and >93% articulatory precision. Unstructured tasks as in conversational exchange/discourse: pt achieving minimal to moderate right om rom and 100% intelligibilty with <15% distortions of /s/; /z/; 'sh'; 'ch'; /dg/    Voice:  No dysphonia; no voice cessatioin. Less severity of vocal ceballos . Good response to breath support for phonation with decrease in vocal ceballos   Phonation duration targeting breath support and loud voice:   Targeted breath support with CV; CVCVCV using \"sh\" and \"w\"  , as well as, plosive phonemes at onset and 'loud ' voice  Audible phonation for  up to >9 syllable sentence level during structure tasks  Conversational exchange: no dysphonia/cessation   N/a      Goal 2: Patient will improve alternating/divided attention to min cues during functional therapy tasks.       Working memory for:   Rule application for attention for detail to recognized errors : set up ; conditioning to task; emphasis on left to right thorough scannin to mild assist  Recall for manipulation of 1-3 pc information using appointment cards/schedules/TV guides; etc : 0 to mild assist  Recall of 2 -3 pc information after a delay/distraction for manipulation: 0 to mild assist     N/a      Goal 3: Patient will improve recall of new information, recall
would be filled. Once verbally explained, she was able to complete correctly. Pt required assist to open pill bottles, spilled pills to table due to poor right hand coordination    Returned to room, transferred to bed, sit to supine with SBA, positioned with pillows, removed shoes edge of bed, OT placed slipper socks on due to fatigue. Pt then stated she needed to urinate. Supine to sit with SBA, cues for safety, amb with rolling walker to commode, CGA/min assist  Transferred to commode with min assist.  Managed clothig with CGA/min asist  Hygiene after urination with SBA for balance on commode. She then returned to bed with same assist, bed alarm in place,call light in reach. Pt asking OT to turn on TV, but she was then able to press button to find station. Assessment  Assessment  Assessment: Pt is limited today with her fatigue, but did appear less anxious than saturday. She requried mod assist for bathing, dressed UB with mod assist, LB with max assist.  Transfers with min/mod assist, functional mobiltiy to/from bathroom with rolling walker with min assist.  Barriers include right UE weakness, decreased problem solving, insight, memory. Pt lives alone, will need to cont to assess for safety. Anticipate pt will require another 1 1/2 - 2 wks therapy before discharge. cont to assess for DME  Activity Tolerance: Patient tolerated treatment well;Patient limited by endurance  Discharge Recommendations: Continue to assess pending progress;Home with Home health OT; Home with assist PRN  OT Equipment Recommendations  Other: cont to assess  Safety Devices  Safety Devices in place: Yes  Type of devices: Gait belt (to speech therapy after OT per transport)    Patient Education  Education  Education Given To: Patient  Education Provided: ADL Function;Transfer Training;Mobility Training;Cognition;Visual Perceptual Function; Safety  Education Method: Demonstration;Verbal;Teach Back  Barriers to Learning:
having anxiety with this. PT provides encouragement that she has time and she needs to start discussing with family and friends a safe plan when she does discharge. Ensured that social service would assist with any needed support or services prior to discharge. Patient continues to function below baseline and will benefit from continued skilled therapy for strengthening, gait training, and neuromuscular re-education to allow for safe return home. Activity Tolerance: Patient tolerated treatment well;Patient limited by endurance  Discharge Recommendations: Continue to assess pending progress; Patient would benefit from continued therapy after discharge  PT Equipment Recommendations  Other: has wheeled walker at home - reporting uncertain if she has a RW at home and not sure who could look for it at her home    Goals  Patient Goals   Patient Goals : Brenda Patterson able to get up and walk without falling. \"  Short Term Goals  Time Frame for Short Term Goals: approx 1 week from 5/8/2023  Short Term Goal 1: bed mobility with supervision  Short Term Goal 2: transfers with SBA  Short Term Goal 3: Ambulate 150' with wheeled walker with SBA  Short Term Goal 4: Ascend/descend 8 steps with bilateral rails with CGA  Short Term Goal 5: Ascned/descend curb step with wheeled walker with CGA  Long Term Goals  Time Frame for Long Term Goals : upon discharge - approx 10-14 days from 5/8/2023  Long Term Goal 1: Bed mobility with modified independence  Long Term Goal 2: Transfers with modified independence  Long Term Goal 3: Ambulate 150' with wheeled walker with modified independence  Long Term Goal 4: Ascend/descend 12 steps with one rail with supervision  Long Term Goal 5: Ascend/descend curb step with wheeled walker with supervision    PLAN OF CARE/SAFETY  Physcial Therapy Plan  General Plan:  minutes of therapy at least 5 out of 7 days a week  Days Per Week: 5 Days  Therapy Duration:  (10-14 days)  Current Treatment Recommendations:
health OT; Home with assist PRN  OT Equipment Recommendations  Equipment Needed: Yes  Mobility Devices: ADL Assistive Devices  ADL Assistive Devices: Toilet Safety Frame;Transfer Tub Bench;Walker Tray  Safety Devices  Safety Devices in place: Yes  Type of devices: Call light within reach; Chair alarm in place; Left in chair;Gait belt    Patient Education  Education  Education Given To: Patient  Education Provided: ADL Function;Mobility Training;Transfer Training  Education Method: Demonstration;Verbal;Teach Back  Barriers to Learning: Cognition  Education Outcome: Verbalized understanding;Demonstrated understanding;Continued education needed    Plan  Occupational Therapy Plan  Times Per Week: 5-6  Times Per Day: Twice a day  Days Per Week: 5 Days  Hours Per Day: 1.5 hours  Therapy Duration: 2 Weeks  Current Treatment Recommendations: Balance training;Functional mobility training; Endurance training; Safety education & training;Patient/Caregiver education & training;Equipment evaluation, education, & procurement;Self-Care / ADL;Cognitive/Perceptual training;Home management training    Goals  Patient Goals   Patient goals : \"I want to be able to get back to my apartment, be able to take my own bath, walk around\"  Short Term Goals  Time Frame for Short Term Goals: 1 week pt will. .. Short Term Goal 1: bathe with CGA and AD Goal Met  Short Term Goal 2: dress UB with min assist, LB with mod assist and AD as needed  Goal Met  Short Term Goal 3: toilet with CGA and AD  Goal Met  Short Term Goal 4: transfers and functional mobility with CGA and AD  Goal Met  Short Term Goal 5: increase RUE function to assist with ADL 50% as dominant extremity  Goal Met  Short Term Goal 6: increase right side awareness to require cues 25% of ADL tasks  Goal Met  Short Term Goal 7: assess for safety with IADL skills  Goal Met  Long Term Goals  Time Frame for Long Term Goals : 2 weeks pt will. .  Long Term Goal 1: bathe indep with AD  Goal Met  Long
when presented with presented situations: external cues  Verbally directing sequence to presented situations: external cues for org  VPS/PS math language:   counting money: 85% for concrete counting/totaling money; concrete money exchanges using <$10.00 of paper/coin denominations  Numeric reasoning for PS and money/time measurements: mild to moderate assist warranted with this more complex task    PS and use of schedules/TV guides/Calendars/med labels: 0 to intermittent assist N/a    Other areas targeted:       Education:   Ongoing ed    Safety Devices: [] Call light within reach  [] Chair alarm activated  [] Bed alarm activated  [x] Other: transport returned pt to room [] Call light within reach  [] Chair alarm activated  [] Bed alarm activated  [] Other:    Progress Assessment: 5/8/2023: Less dysnomia today. Flat affect. Improving intelligibility of connected speech despite right facial asym  5/9/2023: continue current tx poc   Plan: Continue as per plan of care. Continued Tx Upon Discharge: ?    [] Yes [] No [x] TBD based on progress while on ARU [] Vital Stim indicated [] Other:   Estimated discharge date: TBD   Discharge recommendations:   [] Home independently  [] Home with assistance []  24 hour supervision  [] ECF [] Other:     Additional information:     Interventions used during Rehab Stay:  [] Speech/Language Treatment  [] Instruction in HEP [] Group [] Dysphagia Treatment [x] Cognitive Treatment   [x] Other: Dysarthria          Electronically Signed by    Elisha Aldana. Shravan,MS,CCC,SLP [id number]  Speech and Language Pathologist
Patient reporting she doesn't think she can do more and she canceled her OT appointment, but PT educated patient that the purpose of rehab is for all the therapy session and she needs to try and do some of the therapy. Patient was set up on restorator and patient completed 2.5 minutes. She required encouragement to complete the 25 minutes but was able to do so. Patient has reduced endurance and requires encouragement to complete full session. Patient left seated in wheelchair with care transitioned to OT, Regine Abbott.         Goals  Patient Goals   Patient Goals : Tiffanie Naranjo able to get up and walk without falling. \"  Short Term Goals  Time Frame for Short Term Goals: approx 1 week from 5/8/2023  Short Term Goal 1: bed mobility with supervision  Short Term Goal 2: transfers with SBA  Short Term Goal 3: Ambulate 150' with wheeled walker with SBA  Short Term Goal 4: Ascend/descend 8 steps with bilateral rails with CGA  Short Term Goal 5: Ascned/descend curb step with wheeled walker with CGA  Long Term Goals  Time Frame for Long Term Goals : upon discharge - approx 10-14 days from 5/8/2023  Long Term Goal 1: Bed mobility with modified independence  Long Term Goal 2: Transfers with modified independence  Long Term Goal 3: Ambulate 150' with wheeled walker with modified independence  Long Term Goal 4: Ascend/descend 12 steps with one rail with supervision  Long Term Goal 5: Ascend/descend curb step with wheeled walker with supervision    PLAN OF CARE/SAFETY  Physcial Therapy Plan  General Plan:  minutes of therapy at least 5 out of 7 days a week  Days Per Week: 5 Days  Therapy Duration:  (10-14 days)  Current Treatment Recommendations: Strengthening;ROM;Balance training;Functional mobility training;Transfer training; Endurance training;Gait training;Stair training;Neuromuscular re-education;Cognitive reorientation;Equipment evaluation, education, & procurement;Patient/Caregiver education & training;Home exercise
no pain complaints this date; but pt voicing concern that pt will be discharged without necessary help. SLP reviewed recent admit; plan for PT/OT/SLP/NSG care and SW to help with d/c planning when MD feels ready for d/c. Pt reporting has a friend that may be able to help and needs to call. SLP explained she can let SW know . 5/15/2023: 2 caregivers here for ed. See above areas targeted this daily tx flow sheet (om ex; om speech; cognitive-linguistic -memory; and recommendations for situations for pt will need assist). Both voiced understanding and actually reported pt needed assist prior to onset with banking/meds. 5/16/2023: ongoing ed in prompting strategies with one of her caregivers (ie prompting with pt's focused attention; assisting pt with self coaching)   Plan: Continue as per plan of care. Continued Tx Upon Discharge: ?    [x] Yes [] No [] TBD based on progress while on ARU [] Vital Stim indicated [] Other:   Estimated discharge date: ~5/18/2023   Discharge recommendations:   [] Home independently  [x] Home with assistance []  24 hour supervision  [] ECF [] Other:     Additional information:     Interventions used during Rehab Stay:  [] Speech/Language Treatment  [] Instruction in HEP [] Group [] Dysphagia Treatment [x] Cognitive Treatment   [x] Other: Dysarthria          Electronically Signed by    Narcisa Marie. Shravan,MS,CCC,SLP [id number]  Speech and Language Pathologist
reasoning with concern for potential reduced executive function d/t aforementioned impairments. Patient with adeqaute verbalization for solutions to daily problems and reasoning tasks, but with suspected potential for increased difficulty with more complex executive function tasks as well as carryover during functional tasks. Patient requires mod cues for alternating attention and verbalizes recognition for decreased function. Poor delayed recall of novel informaiton even with cueing. Mod cues for basic calculations as well as finance and temporal reasoning with suspicion reduced word-finding may be an exacerbating factor. Aphasia Diagnosis: Auditory and reading comprehension appear grossly WFL. Concern for mild anomia characterized by reduced confrontation naming for infrequent objects/items and decreased divergent naming. Occasional decreased word-finding at conversational level does not appear to significnatly impact self-expression. Speech Diagnosis: Somewhat variable dysarthria noted as mild initially declining to mild-moderate with increasing fatigue during session. Patient presents with reduced breath support for sustained phonation and amplification as well as R>L labial and lingual weakness resulting in articulatory imprecison and slurred speech. PT  Position Activity Restriction  Other position/activity restrictions: No no sleeve left UE due to history of mastectomy, IV right wrist  Objective     Sit to Stand: Minimal Assistance (poor hand placement)  Stand to Sit: Minimal Assistance (poor hand placement and safety as she does not fully align with surface before sitting)  Bed to Chair: Minimal assistance (with wheeled walker, poor safety)  Device: Rolling Walker  Assistance: Minimal assistance, Contact guard assistance  Distance: Patient fatigues quickly and needs encouragement to ambulate. She is impulsive with turns. decreased rigth step height and lenght. Slow tapan.   OT  PT Equipment
infrequent objects/items and decreased divergent naming. Occasional decreased word-finding at conversational level does not appear to significnatly impact self-expression. Speech Diagnosis: Somewhat variable dysarthria noted as mild initially declining to mild-moderate with increasing fatigue during session. Patient presents with reduced breath support for sustained phonation and amplification as well as R>L labial and lingual weakness resulting in articulatory imprecison and slurred speech. PT  Position Activity Restriction  Other position/activity restrictions: No no sleeve left UE due to history of mastectomy, IV right wrist  Objective     Sit to Stand: Minimal Assistance (poor hand placement)  Stand to Sit: Minimal Assistance (poor hand placement and safety as she does not fully align with surface before sitting)  Bed to Chair: Minimal assistance (with wheeled walker, poor safety)  Device: Rolling Walker  Assistance: Minimal assistance, Contact guard assistance  Distance: Patient fatigues quickly and needs encouragement to ambulate. She is impulsive with turns. decreased rigth step height and lenght. Slow tapan. OT  PT Equipment Recommendations  Other: has wheeled walker at home - reporting uncertain if she has a RW at home and not sure who could look for it at her home  Toilet - Technique: Ambulating  Equipment Used: Grab bars  Assessment        SLP          Body mass index is 19.37 kg/m².     Rehabilitation Diagnosis:   Stroke, 1.2, Right Body (L Brain)        Assessment and Plan:     Impairments: right hemiparesis, right sensory deficit, decreased balance, endurance, dysarthria, dysphagia, cognition     Acute ischemic stroke  -Localization: left basal ganglia with extension to corona radiata  -Etiology: likely small vessel dz  -Secondary ppx: ASA, statin  -PT/OT/SLP     Chest pain at outside hospital  -CTA chest negative (pericardial effusion r/o with subsequent TTE)  -Troponin trended down  -TTE: EF
Anticipated Dispo: home alone, possibly able to have friend assist  Services: HH PT, OT, SLP, RN  DME: RAMSEY PHILLIPS, walker basket/tray  ELOS: 5/18      Vazquez Tarango.  Nichole Hernandez MD 5/12/2023, 2:23 PM
miralax, MoM, and bisacodyl supp. Safety   -fall precautions     Pain control  -acetaminophen prn     DVT ppx  -lovenox    Rehab Progress: Making progress. Working on right side function, balance, compensatory strategies, speech, cognition. Barriers include: right hemiparesis, cognitive deficits, lives alone, anxiety/depression/low confidence. Anticipated Dispo: home alone, intermittent friend assist  Services:  PT, OT, SLP, RN  DME: TTB, TSF, walker basket/tray  ELOS: 5/18      Soledad Nair.  Rollo Bumpers, MD 5/17/2023, 1:30 PM

## 2023-05-18 NOTE — CARE COORDINATION
Referral made to Taylor Malhotra at [address] to initiate Assisted Living Waiver and suggested she call pt's sister to initiate it.   Both patient and sister were approving of this referral.  Saad Ballard [facility]     Case Management   [phone]    5/18/2023  9:01 AM

## 2023-05-18 NOTE — DISCHARGE SUMMARY
Physical Medicine & Rehabilitation  Discharge Summary     Patient Identification:  Skinny Guerrero  : 1940  Admit date: 2023  Discharge date:  2023  Attending provider: Bruno Cummins MD        Primary care provider: [facility]     Discharge Diagnoses:   Patient Active Problem List   Diagnosis    Hyponatremia    Hypokalemia    Acute metabolic encephalopathy    Nausea and vomiting    Acute ischemic stroke [facility])       History of Present Illness/Acute Hospital Course:  Patient is an 79 yo F with pmh HTN, HLD, breast cancer, thyroid disease, and anxiety who initially presented to [address] [place] [address] on 2023 with fluctuating right side weakness x 3 days. Imaging revealed acute ischemic infarct in the left basal ganglia with extension to the corona radiata. Course complicated by uncontrolled HTN, elevated troponin. Now presents to ARU with impaired mobility, self-care, and cognition/communication below her baseline. Inpatient Rehabilitation Course:   Skinny Guerrero is a 78 y.o. female admitted to inpatient rehabilitation on 2023 with Acute ischemic stroke ([address] The patient participated in an aggressive multidisciplinary inpatient rehabilitation program involving 3 hours of therapy per day, at least 5 days per week. She made good progress with regard to functional mobility, balance, compensatory strategies, cognition, dysarthria. Remains limited by right hemiparesis and significant anxiety. Now overall modified independent. Discharging home alone with intermittent assist from friends. Maximizing services at home with home care and [facility]. Home care services and DME ordered as below. Patient will follow-up with PCP, Neurology, Psych.      Impairments:  right hemiparesis, right sensory deficit, decreased balance, endurance, dysarthria, dysphagia, cognition    Medical Management:  Acute ischemic stroke  -Localization: left basal ganglia with extension to corona

## 2023-06-25 ENCOUNTER — APPOINTMENT (OUTPATIENT)
Dept: GENERAL RADIOLOGY | Age: 83
DRG: 481 | End: 2023-06-25
Payer: MEDICARE

## 2023-06-25 ENCOUNTER — HOSPITAL ENCOUNTER (INPATIENT)
Age: 83
LOS: 3 days | Discharge: OTHER INSTITUTION | DRG: 481 | End: 2023-06-28
Attending: INTERNAL MEDICINE | Admitting: INTERNAL MEDICINE
Payer: MEDICARE

## 2023-06-25 DIAGNOSIS — S72.001A CLOSED RIGHT HIP FRACTURE, INITIAL ENCOUNTER (HCC): Primary | ICD-10-CM

## 2023-06-25 PROBLEM — K21.9 GERD (GASTROESOPHAGEAL REFLUX DISEASE): Status: ACTIVE | Noted: 2023-06-25

## 2023-06-25 PROBLEM — I10 HTN (HYPERTENSION): Status: ACTIVE | Noted: 2023-06-25

## 2023-06-25 PROBLEM — E78.00 HIGH CHOLESTEROL: Status: ACTIVE | Noted: 2023-06-25

## 2023-06-25 PROBLEM — D64.9 ANEMIA: Status: ACTIVE | Noted: 2023-06-25

## 2023-06-25 PROBLEM — S72.002A CLOSED DISPLACED FRACTURE OF LEFT FEMORAL NECK (HCC): Status: ACTIVE | Noted: 2023-06-25

## 2023-06-25 LAB
BACTERIA URNS QL MICRO: NORMAL /HPF
BASOPHILS # BLD: 0.1 K/UL (ref 0–0.2)
BASOPHILS NFR BLD: 0.9 %
BILIRUB UR QL STRIP.AUTO: NEGATIVE
CLARITY UR: CLEAR
COLOR UR: YELLOW
DEPRECATED RDW RBC AUTO: 16.3 % (ref 12.4–15.4)
EOSINOPHIL # BLD: 0.2 K/UL (ref 0–0.6)
EOSINOPHIL NFR BLD: 3.5 %
EPI CELLS #/AREA URNS AUTO: 4 /HPF (ref 0–5)
GLUCOSE UR STRIP.AUTO-MCNC: NEGATIVE MG/DL
HCT VFR BLD AUTO: 35.5 % (ref 36–48)
HGB BLD-MCNC: 11.7 G/DL (ref 12–16)
HGB UR QL STRIP.AUTO: ABNORMAL
HYALINE CASTS #/AREA URNS AUTO: 0 /LPF (ref 0–8)
KETONES UR STRIP.AUTO-MCNC: NEGATIVE MG/DL
LEUKOCYTE ESTERASE UR QL STRIP.AUTO: NEGATIVE
LYMPHOCYTES # BLD: 1 K/UL (ref 1–5.1)
LYMPHOCYTES NFR BLD: 17.1 %
MCH RBC QN AUTO: 27.3 PG (ref 26–34)
MCHC RBC AUTO-ENTMCNC: 33 G/DL (ref 31–36)
MCV RBC AUTO: 82.5 FL (ref 80–100)
MONOCYTES # BLD: 0.6 K/UL (ref 0–1.3)
MONOCYTES NFR BLD: 9.5 %
NEUTROPHILS # BLD: 4.1 K/UL (ref 1.7–7.7)
NEUTROPHILS NFR BLD: 69 %
NITRITE UR QL STRIP.AUTO: NEGATIVE
PH UR STRIP.AUTO: 7 [PH] (ref 5–8)
PLATELET # BLD AUTO: 187 K/UL (ref 135–450)
PMV BLD AUTO: 8.6 FL (ref 5–10.5)
PROT UR STRIP.AUTO-MCNC: NEGATIVE MG/DL
RBC # BLD AUTO: 4.3 M/UL (ref 4–5.2)
RBC CLUMPS #/AREA URNS AUTO: 3 /HPF (ref 0–4)
SP GR UR STRIP.AUTO: 1.01 (ref 1–1.03)
TROPONIN, HIGH SENSITIVITY: 19 NG/L (ref 0–14)
UA COMPLETE W REFLEX CULTURE PNL UR: ABNORMAL
UA DIPSTICK W REFLEX MICRO PNL UR: YES
URN SPEC COLLECT METH UR: ABNORMAL
UROBILINOGEN UR STRIP-ACNC: 1 E.U./DL
WBC # BLD AUTO: 5.9 K/UL (ref 4–11)
WBC #/AREA URNS AUTO: 1 /HPF (ref 0–5)

## 2023-06-25 PROCEDURE — 36415 COLL VENOUS BLD VENIPUNCTURE: CPT

## 2023-06-25 PROCEDURE — 6360000002 HC RX W HCPCS: Performed by: INTERNAL MEDICINE

## 2023-06-25 PROCEDURE — 96374 THER/PROPH/DIAG INJ IV PUSH: CPT

## 2023-06-25 PROCEDURE — 85025 COMPLETE CBC W/AUTO DIFF WBC: CPT

## 2023-06-25 PROCEDURE — 6370000000 HC RX 637 (ALT 250 FOR IP): Performed by: INTERNAL MEDICINE

## 2023-06-25 PROCEDURE — 1200000000 HC SEMI PRIVATE

## 2023-06-25 PROCEDURE — 93005 ELECTROCARDIOGRAM TRACING: CPT | Performed by: INTERNAL MEDICINE

## 2023-06-25 PROCEDURE — 99285 EMERGENCY DEPT VISIT HI MDM: CPT

## 2023-06-25 PROCEDURE — 81001 URINALYSIS AUTO W/SCOPE: CPT

## 2023-06-25 PROCEDURE — 2580000003 HC RX 258: Performed by: INTERNAL MEDICINE

## 2023-06-25 PROCEDURE — 71045 X-RAY EXAM CHEST 1 VIEW: CPT

## 2023-06-25 PROCEDURE — 73502 X-RAY EXAM HIP UNI 2-3 VIEWS: CPT

## 2023-06-25 PROCEDURE — 84484 ASSAY OF TROPONIN QUANT: CPT

## 2023-06-25 RX ORDER — TRANEXAMIC ACID 10 MG/ML
1000 INJECTION, SOLUTION INTRAVENOUS
Status: DISCONTINUED | OUTPATIENT
Start: 2023-06-25 | End: 2023-06-26

## 2023-06-25 RX ORDER — LEVOTHYROXINE SODIUM 0.03 MG/1
50 TABLET ORAL DAILY
Status: DISCONTINUED | OUTPATIENT
Start: 2023-06-25 | End: 2023-06-28 | Stop reason: HOSPADM

## 2023-06-25 RX ORDER — PRAVASTATIN SODIUM 80 MG/1
80 TABLET ORAL DAILY
Status: DISCONTINUED | OUTPATIENT
Start: 2023-06-25 | End: 2023-06-28 | Stop reason: HOSPADM

## 2023-06-25 RX ORDER — OXYCODONE HYDROCHLORIDE 5 MG/1
10 TABLET ORAL EVERY 4 HOURS PRN
Status: DISCONTINUED | OUTPATIENT
Start: 2023-06-25 | End: 2023-06-28 | Stop reason: HOSPADM

## 2023-06-25 RX ORDER — CETIRIZINE HYDROCHLORIDE 10 MG/1
10 TABLET ORAL DAILY
Status: DISCONTINUED | OUTPATIENT
Start: 2023-06-25 | End: 2023-06-28 | Stop reason: HOSPADM

## 2023-06-25 RX ORDER — FLUTICASONE PROPIONATE 50 MCG
1 SPRAY, SUSPENSION (ML) NASAL DAILY
Status: DISCONTINUED | OUTPATIENT
Start: 2023-06-25 | End: 2023-06-28 | Stop reason: HOSPADM

## 2023-06-25 RX ORDER — HYDRALAZINE HYDROCHLORIDE 20 MG/ML
10 INJECTION INTRAMUSCULAR; INTRAVENOUS EVERY 6 HOURS PRN
Status: DISCONTINUED | OUTPATIENT
Start: 2023-06-25 | End: 2023-06-28 | Stop reason: HOSPADM

## 2023-06-25 RX ORDER — SODIUM CHLORIDE 9 MG/ML
INJECTION, SOLUTION INTRAVENOUS CONTINUOUS
Status: DISCONTINUED | OUTPATIENT
Start: 2023-06-25 | End: 2023-06-28 | Stop reason: HOSPADM

## 2023-06-25 RX ORDER — SODIUM CHLORIDE 0.9 % (FLUSH) 0.9 %
5-40 SYRINGE (ML) INJECTION EVERY 12 HOURS SCHEDULED
Status: DISCONTINUED | OUTPATIENT
Start: 2023-06-25 | End: 2023-06-28 | Stop reason: HOSPADM

## 2023-06-25 RX ORDER — POTASSIUM CHLORIDE 7.45 MG/ML
10 INJECTION INTRAVENOUS PRN
Status: DISCONTINUED | OUTPATIENT
Start: 2023-06-25 | End: 2023-06-25

## 2023-06-25 RX ORDER — SODIUM CHLORIDE 0.9 % (FLUSH) 0.9 %
10 SYRINGE (ML) INJECTION PRN
Status: DISCONTINUED | OUTPATIENT
Start: 2023-06-25 | End: 2023-06-28 | Stop reason: HOSPADM

## 2023-06-25 RX ORDER — LISINOPRIL 20 MG/1
40 TABLET ORAL DAILY
Status: DISCONTINUED | OUTPATIENT
Start: 2023-06-25 | End: 2023-06-28 | Stop reason: HOSPADM

## 2023-06-25 RX ORDER — ESCITALOPRAM OXALATE 5 MG/1
5 TABLET ORAL NIGHTLY
COMMUNITY

## 2023-06-25 RX ORDER — POTASSIUM CHLORIDE 7.45 MG/ML
10 INJECTION INTRAVENOUS PRN
Status: DISCONTINUED | OUTPATIENT
Start: 2023-06-25 | End: 2023-06-28 | Stop reason: HOSPADM

## 2023-06-25 RX ORDER — ESCITALOPRAM OXALATE 10 MG/1
5 TABLET ORAL NIGHTLY
Status: DISCONTINUED | OUTPATIENT
Start: 2023-06-25 | End: 2023-06-28 | Stop reason: HOSPADM

## 2023-06-25 RX ORDER — POTASSIUM CHLORIDE 20 MEQ/1
40 TABLET, EXTENDED RELEASE ORAL PRN
Status: DISCONTINUED | OUTPATIENT
Start: 2023-06-25 | End: 2023-06-25

## 2023-06-25 RX ORDER — HYDROCHLOROTHIAZIDE 25 MG/1
50 TABLET ORAL DAILY
Status: DISCONTINUED | OUTPATIENT
Start: 2023-06-25 | End: 2023-06-25

## 2023-06-25 RX ORDER — SODIUM CHLORIDE 9 MG/ML
INJECTION, SOLUTION INTRAVENOUS PRN
Status: DISCONTINUED | OUTPATIENT
Start: 2023-06-25 | End: 2023-06-28 | Stop reason: HOSPADM

## 2023-06-25 RX ORDER — MORPHINE SULFATE 2 MG/ML
2 INJECTION, SOLUTION INTRAMUSCULAR; INTRAVENOUS
Status: DISCONTINUED | OUTPATIENT
Start: 2023-06-25 | End: 2023-06-28 | Stop reason: HOSPADM

## 2023-06-25 RX ORDER — CITALOPRAM 20 MG/1
10 TABLET ORAL DAILY
Status: DISCONTINUED | OUTPATIENT
Start: 2023-06-25 | End: 2023-06-25

## 2023-06-25 RX ORDER — CARVEDILOL 25 MG/1
25 TABLET ORAL 2 TIMES DAILY WITH MEALS
Status: DISCONTINUED | OUTPATIENT
Start: 2023-06-25 | End: 2023-06-28 | Stop reason: HOSPADM

## 2023-06-25 RX ORDER — ONDANSETRON 4 MG/1
4 TABLET, ORALLY DISINTEGRATING ORAL EVERY 8 HOURS PRN
Status: DISCONTINUED | OUTPATIENT
Start: 2023-06-25 | End: 2023-06-28 | Stop reason: HOSPADM

## 2023-06-25 RX ORDER — 0.9 % SODIUM CHLORIDE 0.9 %
500 INTRAVENOUS SOLUTION INTRAVENOUS PRN
Status: DISCONTINUED | OUTPATIENT
Start: 2023-06-25 | End: 2023-06-28 | Stop reason: HOSPADM

## 2023-06-25 RX ORDER — POTASSIUM CHLORIDE 20 MEQ/1
40 TABLET, EXTENDED RELEASE ORAL PRN
Status: DISCONTINUED | OUTPATIENT
Start: 2023-06-25 | End: 2023-06-28 | Stop reason: HOSPADM

## 2023-06-25 RX ORDER — OXYCODONE HYDROCHLORIDE 5 MG/1
5 TABLET ORAL EVERY 4 HOURS PRN
Status: DISCONTINUED | OUTPATIENT
Start: 2023-06-25 | End: 2023-06-28 | Stop reason: HOSPADM

## 2023-06-25 RX ORDER — ACETAMINOPHEN 325 MG/1
650 TABLET ORAL EVERY 4 HOURS PRN
Status: DISCONTINUED | OUTPATIENT
Start: 2023-06-25 | End: 2023-06-28 | Stop reason: HOSPADM

## 2023-06-25 RX ORDER — ENOXAPARIN SODIUM 100 MG/ML
40 INJECTION SUBCUTANEOUS DAILY
Status: DISCONTINUED | OUTPATIENT
Start: 2023-06-25 | End: 2023-06-26

## 2023-06-25 RX ORDER — ONDANSETRON 2 MG/ML
4 INJECTION INTRAMUSCULAR; INTRAVENOUS EVERY 6 HOURS PRN
Status: DISCONTINUED | OUTPATIENT
Start: 2023-06-25 | End: 2023-06-28 | Stop reason: HOSPADM

## 2023-06-25 RX ORDER — CARVEDILOL 25 MG/1
25 TABLET ORAL 2 TIMES DAILY WITH MEALS
COMMUNITY
Start: 2023-05-05

## 2023-06-25 RX ORDER — PANTOPRAZOLE SODIUM 40 MG/1
40 TABLET, DELAYED RELEASE ORAL
Status: DISCONTINUED | OUTPATIENT
Start: 2023-06-25 | End: 2023-06-25

## 2023-06-25 RX ORDER — MORPHINE SULFATE 4 MG/ML
4 INJECTION, SOLUTION INTRAMUSCULAR; INTRAVENOUS
Status: DISCONTINUED | OUTPATIENT
Start: 2023-06-25 | End: 2023-06-28 | Stop reason: HOSPADM

## 2023-06-25 RX ORDER — FENTANYL CITRATE 50 UG/ML
25 INJECTION, SOLUTION INTRAMUSCULAR; INTRAVENOUS ONCE
Status: COMPLETED | OUTPATIENT
Start: 2023-06-25 | End: 2023-06-25

## 2023-06-25 RX ADMIN — LISINOPRIL 40 MG: 20 TABLET ORAL at 14:13

## 2023-06-25 RX ADMIN — FENTANYL CITRATE 25 MCG: 50 INJECTION, SOLUTION INTRAMUSCULAR; INTRAVENOUS at 10:20

## 2023-06-25 RX ADMIN — HYDRALAZINE HYDROCHLORIDE 10 MG: 20 INJECTION INTRAMUSCULAR; INTRAVENOUS at 23:33

## 2023-06-25 RX ADMIN — CARVEDILOL 25 MG: 25 TABLET, FILM COATED ORAL at 14:13

## 2023-06-25 RX ADMIN — CETIRIZINE HYDROCHLORIDE 10 MG: 10 TABLET, FILM COATED ORAL at 15:10

## 2023-06-25 RX ADMIN — PRAVASTATIN SODIUM 80 MG: 80 TABLET ORAL at 15:10

## 2023-06-25 RX ADMIN — ENOXAPARIN SODIUM 40 MG: 100 INJECTION SUBCUTANEOUS at 15:16

## 2023-06-25 RX ADMIN — ESCITALOPRAM OXALATE 5 MG: 10 TABLET ORAL at 21:24

## 2023-06-25 RX ADMIN — SODIUM CHLORIDE: 9 INJECTION, SOLUTION INTRAVENOUS at 15:16

## 2023-06-25 RX ADMIN — LEVOTHYROXINE SODIUM 50 MCG: 0.03 TABLET ORAL at 15:10

## 2023-06-25 ASSESSMENT — PAIN SCALES - GENERAL
PAINLEVEL_OUTOF10: 2
PAINLEVEL_OUTOF10: 8

## 2023-06-26 ENCOUNTER — APPOINTMENT (OUTPATIENT)
Dept: GENERAL RADIOLOGY | Age: 83
DRG: 481 | End: 2023-06-26
Payer: MEDICARE

## 2023-06-26 ENCOUNTER — ANESTHESIA EVENT (OUTPATIENT)
Dept: OPERATING ROOM | Age: 83
DRG: 481 | End: 2023-06-26
Payer: MEDICARE

## 2023-06-26 ENCOUNTER — ANESTHESIA (OUTPATIENT)
Dept: OPERATING ROOM | Age: 83
DRG: 481 | End: 2023-06-26
Payer: MEDICARE

## 2023-06-26 PROBLEM — S72.001A CLOSED RIGHT HIP FRACTURE, INITIAL ENCOUNTER (HCC): Status: ACTIVE | Noted: 2023-06-25

## 2023-06-26 LAB
ABO + RH BLD: NORMAL
ANION GAP SERPL CALCULATED.3IONS-SCNC: 11 MMOL/L (ref 3–16)
BASOPHILS # BLD: 0 K/UL (ref 0–0.2)
BASOPHILS NFR BLD: 0.8 %
BLD GP AB SCN SERPL QL: NORMAL
BUN SERPL-MCNC: 5 MG/DL (ref 7–20)
CALCIUM SERPL-MCNC: 8.4 MG/DL (ref 8.3–10.6)
CHLORIDE SERPL-SCNC: 102 MMOL/L (ref 99–110)
CO2 SERPL-SCNC: 22 MMOL/L (ref 21–32)
CREAT SERPL-MCNC: <0.5 MG/DL (ref 0.6–1.2)
DEPRECATED RDW RBC AUTO: 16.2 % (ref 12.4–15.4)
EKG ATRIAL RATE: 63 BPM
EKG DIAGNOSIS: NORMAL
EKG P AXIS: 71 DEGREES
EKG P-R INTERVAL: 190 MS
EKG Q-T INTERVAL: 434 MS
EKG QRS DURATION: 86 MS
EKG QTC CALCULATION (BAZETT): 444 MS
EKG R AXIS: -4 DEGREES
EKG T AXIS: -5 DEGREES
EKG VENTRICULAR RATE: 63 BPM
EOSINOPHIL # BLD: 0.2 K/UL (ref 0–0.6)
EOSINOPHIL NFR BLD: 3.8 %
GFR SERPLBLD CREATININE-BSD FMLA CKD-EPI: >60 ML/MIN/{1.73_M2}
GLUCOSE SERPL-MCNC: 106 MG/DL (ref 70–99)
HCT VFR BLD AUTO: 33.5 % (ref 36–48)
HGB BLD-MCNC: 11 G/DL (ref 12–16)
INR PPP: 1.1 (ref 0.84–1.16)
LYMPHOCYTES # BLD: 1.1 K/UL (ref 1–5.1)
LYMPHOCYTES NFR BLD: 21.9 %
MCH RBC QN AUTO: 27.2 PG (ref 26–34)
MCHC RBC AUTO-ENTMCNC: 32.7 G/DL (ref 31–36)
MCV RBC AUTO: 83.1 FL (ref 80–100)
MONOCYTES # BLD: 0.5 K/UL (ref 0–1.3)
MONOCYTES NFR BLD: 9.9 %
NEUTROPHILS # BLD: 3.3 K/UL (ref 1.7–7.7)
NEUTROPHILS NFR BLD: 63.6 %
PLATELET # BLD AUTO: 194 K/UL (ref 135–450)
PMV BLD AUTO: 8.6 FL (ref 5–10.5)
POTASSIUM SERPL-SCNC: 3.6 MMOL/L (ref 3.5–5.1)
PROTHROMBIN TIME: 14.2 SEC (ref 11.5–14.8)
RBC # BLD AUTO: 4.03 M/UL (ref 4–5.2)
SODIUM SERPL-SCNC: 135 MMOL/L (ref 136–145)
WBC # BLD AUTO: 5.1 K/UL (ref 4–11)

## 2023-06-26 PROCEDURE — 86850 RBC ANTIBODY SCREEN: CPT

## 2023-06-26 PROCEDURE — 6360000002 HC RX W HCPCS: Performed by: NURSE ANESTHETIST, CERTIFIED REGISTERED

## 2023-06-26 PROCEDURE — A4217 STERILE WATER/SALINE, 500 ML: HCPCS | Performed by: ORTHOPAEDIC SURGERY

## 2023-06-26 PROCEDURE — 6370000000 HC RX 637 (ALT 250 FOR IP): Performed by: ORTHOPAEDIC SURGERY

## 2023-06-26 PROCEDURE — 86901 BLOOD TYPING SEROLOGIC RH(D): CPT

## 2023-06-26 PROCEDURE — 2580000003 HC RX 258: Performed by: INTERNAL MEDICINE

## 2023-06-26 PROCEDURE — 80048 BASIC METABOLIC PNL TOTAL CA: CPT

## 2023-06-26 PROCEDURE — 86900 BLOOD TYPING SEROLOGIC ABO: CPT

## 2023-06-26 PROCEDURE — APPNB45 APP NON BILLABLE 31-45 MINUTES: Performed by: NURSE PRACTITIONER

## 2023-06-26 PROCEDURE — 2709999900 HC NON-CHARGEABLE SUPPLY: Performed by: ORTHOPAEDIC SURGERY

## 2023-06-26 PROCEDURE — 2500000003 HC RX 250 WO HCPCS: Performed by: NURSE ANESTHETIST, CERTIFIED REGISTERED

## 2023-06-26 PROCEDURE — 6360000002 HC RX W HCPCS: Performed by: ORTHOPAEDIC SURGERY

## 2023-06-26 PROCEDURE — 85610 PROTHROMBIN TIME: CPT

## 2023-06-26 PROCEDURE — 2720000010 HC SURG SUPPLY STERILE: Performed by: ORTHOPAEDIC SURGERY

## 2023-06-26 PROCEDURE — 3600000014 HC SURGERY LEVEL 4 ADDTL 15MIN: Performed by: ORTHOPAEDIC SURGERY

## 2023-06-26 PROCEDURE — 36415 COLL VENOUS BLD VENIPUNCTURE: CPT

## 2023-06-26 PROCEDURE — 2580000003 HC RX 258: Performed by: ORTHOPAEDIC SURGERY

## 2023-06-26 PROCEDURE — 3700000000 HC ANESTHESIA ATTENDED CARE: Performed by: ORTHOPAEDIC SURGERY

## 2023-06-26 PROCEDURE — 73501 X-RAY EXAM HIP UNI 1 VIEW: CPT

## 2023-06-26 PROCEDURE — 0QS604Z REPOSITION RIGHT UPPER FEMUR WITH INTERNAL FIXATION DEVICE, OPEN APPROACH: ICD-10-PCS | Performed by: ORTHOPAEDIC SURGERY

## 2023-06-26 PROCEDURE — 93010 ELECTROCARDIOGRAM REPORT: CPT | Performed by: INTERNAL MEDICINE

## 2023-06-26 PROCEDURE — C1713 ANCHOR/SCREW BN/BN,TIS/BN: HCPCS | Performed by: ORTHOPAEDIC SURGERY

## 2023-06-26 PROCEDURE — C1769 GUIDE WIRE: HCPCS | Performed by: ORTHOPAEDIC SURGERY

## 2023-06-26 PROCEDURE — 3600000004 HC SURGERY LEVEL 4 BASE: Performed by: ORTHOPAEDIC SURGERY

## 2023-06-26 PROCEDURE — 1200000000 HC SEMI PRIVATE

## 2023-06-26 PROCEDURE — 6370000000 HC RX 637 (ALT 250 FOR IP): Performed by: INTERNAL MEDICINE

## 2023-06-26 PROCEDURE — 7100000001 HC PACU RECOVERY - ADDTL 15 MIN: Performed by: ORTHOPAEDIC SURGERY

## 2023-06-26 PROCEDURE — 3700000001 HC ADD 15 MINUTES (ANESTHESIA): Performed by: ORTHOPAEDIC SURGERY

## 2023-06-26 PROCEDURE — 7100000000 HC PACU RECOVERY - FIRST 15 MIN: Performed by: ORTHOPAEDIC SURGERY

## 2023-06-26 PROCEDURE — 6360000002 HC RX W HCPCS: Performed by: INTERNAL MEDICINE

## 2023-06-26 PROCEDURE — 2500000003 HC RX 250 WO HCPCS: Performed by: ANESTHESIOLOGY

## 2023-06-26 PROCEDURE — 2500000003 HC RX 250 WO HCPCS: Performed by: ORTHOPAEDIC SURGERY

## 2023-06-26 PROCEDURE — 85025 COMPLETE CBC W/AUTO DIFF WBC: CPT

## 2023-06-26 DEVICE — PLATE BONE 1 H TI ALLOY FOR FEM NK SYS: Type: IMPLANTABLE DEVICE | Site: HIP | Status: FUNCTIONAL

## 2023-06-26 DEVICE — BOLT BONE L80MM DIA10MM GLD TI ALLOY FOR FEM NK SYS: Type: IMPLANTABLE DEVICE | Site: HIP | Status: FUNCTIONAL

## 2023-06-26 DEVICE — SCREW BONE L36MM DIA5MM ST LCK W/ T25 STARDRV: Type: IMPLANTABLE DEVICE | Site: HIP | Status: FUNCTIONAL

## 2023-06-26 DEVICE — SCREW BONE L80MM DIA6.4MM BLU TI ALLOY ANTIROTATION T25: Type: IMPLANTABLE DEVICE | Site: HIP | Status: FUNCTIONAL

## 2023-06-26 RX ORDER — ROCURONIUM BROMIDE 10 MG/ML
INJECTION, SOLUTION INTRAVENOUS PRN
Status: DISCONTINUED | OUTPATIENT
Start: 2023-06-26 | End: 2023-06-26 | Stop reason: SDUPTHER

## 2023-06-26 RX ORDER — HYDROMORPHONE HCL 110MG/55ML
0.5 PATIENT CONTROLLED ANALGESIA SYRINGE INTRAVENOUS EVERY 5 MIN PRN
Status: DISCONTINUED | OUTPATIENT
Start: 2023-06-26 | End: 2023-06-26 | Stop reason: HOSPADM

## 2023-06-26 RX ORDER — PROCHLORPERAZINE EDISYLATE 5 MG/ML
5 INJECTION INTRAMUSCULAR; INTRAVENOUS
Status: DISCONTINUED | OUTPATIENT
Start: 2023-06-26 | End: 2023-06-26 | Stop reason: HOSPADM

## 2023-06-26 RX ORDER — CEFAZOLIN 2 G/1
INJECTION, POWDER, FOR SOLUTION INTRAMUSCULAR; INTRAVENOUS
Status: DISCONTINUED
Start: 2023-06-26 | End: 2023-06-26

## 2023-06-26 RX ORDER — ONDANSETRON 2 MG/ML
4 INJECTION INTRAMUSCULAR; INTRAVENOUS
Status: DISCONTINUED | OUTPATIENT
Start: 2023-06-26 | End: 2023-06-26 | Stop reason: HOSPADM

## 2023-06-26 RX ORDER — PROPOFOL 10 MG/ML
INJECTION, EMULSION INTRAVENOUS PRN
Status: DISCONTINUED | OUTPATIENT
Start: 2023-06-26 | End: 2023-06-26 | Stop reason: SDUPTHER

## 2023-06-26 RX ORDER — MAGNESIUM HYDROXIDE 1200 MG/15ML
LIQUID ORAL CONTINUOUS PRN
Status: COMPLETED | OUTPATIENT
Start: 2023-06-26 | End: 2023-06-26

## 2023-06-26 RX ORDER — FENTANYL CITRATE 50 UG/ML
25 INJECTION, SOLUTION INTRAMUSCULAR; INTRAVENOUS EVERY 5 MIN PRN
Status: DISCONTINUED | OUTPATIENT
Start: 2023-06-26 | End: 2023-06-26 | Stop reason: HOSPADM

## 2023-06-26 RX ORDER — ENOXAPARIN SODIUM 100 MG/ML
40 INJECTION SUBCUTANEOUS DAILY
Status: DISCONTINUED | OUTPATIENT
Start: 2023-06-27 | End: 2023-06-28 | Stop reason: HOSPADM

## 2023-06-26 RX ORDER — BUPIVACAINE HYDROCHLORIDE 5 MG/ML
INJECTION, SOLUTION EPIDURAL; INTRACAUDAL
Status: COMPLETED | OUTPATIENT
Start: 2023-06-26 | End: 2023-06-26

## 2023-06-26 RX ORDER — OXYCODONE HYDROCHLORIDE 5 MG/1
5 TABLET ORAL
Status: DISCONTINUED | OUTPATIENT
Start: 2023-06-26 | End: 2023-06-26 | Stop reason: HOSPADM

## 2023-06-26 RX ORDER — DIPHENHYDRAMINE HYDROCHLORIDE 50 MG/ML
12.5 INJECTION INTRAMUSCULAR; INTRAVENOUS
Status: DISCONTINUED | OUTPATIENT
Start: 2023-06-26 | End: 2023-06-26 | Stop reason: HOSPADM

## 2023-06-26 RX ORDER — ONDANSETRON 2 MG/ML
INJECTION INTRAMUSCULAR; INTRAVENOUS PRN
Status: DISCONTINUED | OUTPATIENT
Start: 2023-06-26 | End: 2023-06-26 | Stop reason: SDUPTHER

## 2023-06-26 RX ORDER — LIDOCAINE HYDROCHLORIDE 20 MG/ML
INJECTION, SOLUTION EPIDURAL; INFILTRATION; INTRACAUDAL; PERINEURAL PRN
Status: DISCONTINUED | OUTPATIENT
Start: 2023-06-26 | End: 2023-06-26 | Stop reason: SDUPTHER

## 2023-06-26 RX ORDER — SODIUM CHLORIDE 0.9 % (FLUSH) 0.9 %
5-40 SYRINGE (ML) INJECTION PRN
Status: DISCONTINUED | OUTPATIENT
Start: 2023-06-26 | End: 2023-06-26 | Stop reason: HOSPADM

## 2023-06-26 RX ORDER — SODIUM CHLORIDE 0.9 % (FLUSH) 0.9 %
5-40 SYRINGE (ML) INJECTION EVERY 12 HOURS SCHEDULED
Status: DISCONTINUED | OUTPATIENT
Start: 2023-06-26 | End: 2023-06-26 | Stop reason: HOSPADM

## 2023-06-26 RX ORDER — FENTANYL CITRATE 50 UG/ML
INJECTION, SOLUTION INTRAMUSCULAR; INTRAVENOUS PRN
Status: DISCONTINUED | OUTPATIENT
Start: 2023-06-26 | End: 2023-06-26 | Stop reason: SDUPTHER

## 2023-06-26 RX ORDER — SODIUM CHLORIDE 9 MG/ML
INJECTION, SOLUTION INTRAVENOUS PRN
Status: DISCONTINUED | OUTPATIENT
Start: 2023-06-26 | End: 2023-06-26 | Stop reason: HOSPADM

## 2023-06-26 RX ORDER — METOPROLOL TARTRATE 5 MG/5ML
INJECTION INTRAVENOUS PRN
Status: DISCONTINUED | OUTPATIENT
Start: 2023-06-26 | End: 2023-06-26 | Stop reason: SDUPTHER

## 2023-06-26 RX ORDER — MEPERIDINE HYDROCHLORIDE 25 MG/ML
12.5 INJECTION INTRAMUSCULAR; INTRAVENOUS; SUBCUTANEOUS
Status: DISCONTINUED | OUTPATIENT
Start: 2023-06-26 | End: 2023-06-26 | Stop reason: HOSPADM

## 2023-06-26 RX ORDER — LABETALOL HYDROCHLORIDE 5 MG/ML
10 INJECTION, SOLUTION INTRAVENOUS
Status: DISCONTINUED | OUTPATIENT
Start: 2023-06-26 | End: 2023-06-26 | Stop reason: HOSPADM

## 2023-06-26 RX ORDER — GLYCOPYRROLATE 0.2 MG/ML
INJECTION INTRAMUSCULAR; INTRAVENOUS PRN
Status: DISCONTINUED | OUTPATIENT
Start: 2023-06-26 | End: 2023-06-26 | Stop reason: SDUPTHER

## 2023-06-26 RX ORDER — HYDRALAZINE HYDROCHLORIDE 20 MG/ML
10 INJECTION INTRAMUSCULAR; INTRAVENOUS
Status: DISCONTINUED | OUTPATIENT
Start: 2023-06-26 | End: 2023-06-26 | Stop reason: HOSPADM

## 2023-06-26 RX ORDER — DEXAMETHASONE SODIUM PHOSPHATE 4 MG/ML
INJECTION, SOLUTION INTRA-ARTICULAR; INTRALESIONAL; INTRAMUSCULAR; INTRAVENOUS; SOFT TISSUE PRN
Status: DISCONTINUED | OUTPATIENT
Start: 2023-06-26 | End: 2023-06-26 | Stop reason: SDUPTHER

## 2023-06-26 RX ADMIN — PHENYLEPHRINE HYDROCHLORIDE 100 MCG: 10 INJECTION INTRAVENOUS at 18:04

## 2023-06-26 RX ADMIN — GLYCOPYRROLATE 0.2 MG: 0.2 INJECTION INTRAMUSCULAR; INTRAVENOUS at 17:54

## 2023-06-26 RX ADMIN — ONDANSETRON 4 MG: 2 INJECTION INTRAMUSCULAR; INTRAVENOUS at 18:09

## 2023-06-26 RX ADMIN — LABETALOL HYDROCHLORIDE 10 MG: 5 INJECTION INTRAVENOUS at 18:40

## 2023-06-26 RX ADMIN — FENTANYL CITRATE 25 MCG: 50 INJECTION, SOLUTION INTRAMUSCULAR; INTRAVENOUS at 18:11

## 2023-06-26 RX ADMIN — CEFAZOLIN 2000 MG: 2 INJECTION, POWDER, FOR SOLUTION INTRAMUSCULAR; INTRAVENOUS at 20:33

## 2023-06-26 RX ADMIN — CARVEDILOL 25 MG: 25 TABLET, FILM COATED ORAL at 09:25

## 2023-06-26 RX ADMIN — PHENYLEPHRINE HYDROCHLORIDE 100 MCG: 10 INJECTION INTRAVENOUS at 17:52

## 2023-06-26 RX ADMIN — PHENYLEPHRINE HYDROCHLORIDE 100 MCG: 10 INJECTION INTRAVENOUS at 17:56

## 2023-06-26 RX ADMIN — FENTANYL CITRATE 25 MCG: 50 INJECTION, SOLUTION INTRAMUSCULAR; INTRAVENOUS at 18:10

## 2023-06-26 RX ADMIN — FENTANYL CITRATE 25 MCG: 50 INJECTION, SOLUTION INTRAMUSCULAR; INTRAVENOUS at 17:23

## 2023-06-26 RX ADMIN — PHENYLEPHRINE HYDROCHLORIDE 200 MCG: 10 INJECTION INTRAVENOUS at 17:33

## 2023-06-26 RX ADMIN — ESCITALOPRAM OXALATE 5 MG: 10 TABLET ORAL at 20:33

## 2023-06-26 RX ADMIN — PHENYLEPHRINE HYDROCHLORIDE 100 MCG: 10 INJECTION INTRAVENOUS at 17:23

## 2023-06-26 RX ADMIN — SODIUM CHLORIDE: 9 INJECTION, SOLUTION INTRAVENOUS at 20:28

## 2023-06-26 RX ADMIN — HYDRALAZINE HYDROCHLORIDE 10 MG: 20 INJECTION INTRAMUSCULAR; INTRAVENOUS at 14:27

## 2023-06-26 RX ADMIN — PHENYLEPHRINE HYDROCHLORIDE 200 MCG: 10 INJECTION INTRAVENOUS at 17:36

## 2023-06-26 RX ADMIN — CEFAZOLIN 2000 MG: 2 INJECTION, POWDER, FOR SOLUTION INTRAMUSCULAR; INTRAVENOUS at 17:20

## 2023-06-26 RX ADMIN — ROCURONIUM BROMIDE 50 MG: 10 INJECTION, SOLUTION INTRAVENOUS at 17:23

## 2023-06-26 RX ADMIN — FENTANYL CITRATE 25 MCG: 50 INJECTION, SOLUTION INTRAMUSCULAR; INTRAVENOUS at 17:44

## 2023-06-26 RX ADMIN — METOPROLOL TARTRATE 2.5 MG: 1 INJECTION, SOLUTION INTRAVENOUS at 18:13

## 2023-06-26 RX ADMIN — DEXAMETHASONE SODIUM PHOSPHATE 4 MG: 4 INJECTION, SOLUTION INTRAMUSCULAR; INTRAVENOUS at 17:41

## 2023-06-26 RX ADMIN — SUGAMMADEX 200 MG: 100 INJECTION, SOLUTION INTRAVENOUS at 18:09

## 2023-06-26 RX ADMIN — LIDOCAINE HYDROCHLORIDE 60 MG: 20 INJECTION, SOLUTION EPIDURAL; INFILTRATION; INTRACAUDAL; PERINEURAL at 17:23

## 2023-06-26 RX ADMIN — SODIUM CHLORIDE: 9 INJECTION, SOLUTION INTRAVENOUS at 06:18

## 2023-06-26 RX ADMIN — PROPOFOL 100 MG: 10 INJECTION, EMULSION INTRAVENOUS at 17:23

## 2023-06-26 RX ADMIN — METOPROLOL TARTRATE 2.5 MG: 1 INJECTION, SOLUTION INTRAVENOUS at 18:16

## 2023-06-26 ASSESSMENT — PAIN DESCRIPTION - DESCRIPTORS: DESCRIPTORS: ACHING

## 2023-06-26 ASSESSMENT — PAIN - FUNCTIONAL ASSESSMENT: PAIN_FUNCTIONAL_ASSESSMENT: 0-10

## 2023-06-27 PROCEDURE — 97530 THERAPEUTIC ACTIVITIES: CPT

## 2023-06-27 PROCEDURE — 6370000000 HC RX 637 (ALT 250 FOR IP): Performed by: NURSE PRACTITIONER

## 2023-06-27 PROCEDURE — 94760 N-INVAS EAR/PLS OXIMETRY 1: CPT

## 2023-06-27 PROCEDURE — 97166 OT EVAL MOD COMPLEX 45 MIN: CPT

## 2023-06-27 PROCEDURE — 1200000000 HC SEMI PRIVATE

## 2023-06-27 PROCEDURE — 6360000002 HC RX W HCPCS: Performed by: ORTHOPAEDIC SURGERY

## 2023-06-27 PROCEDURE — 51798 US URINE CAPACITY MEASURE: CPT

## 2023-06-27 PROCEDURE — 97535 SELF CARE MNGMENT TRAINING: CPT

## 2023-06-27 PROCEDURE — 94150 VITAL CAPACITY TEST: CPT

## 2023-06-27 PROCEDURE — 6370000000 HC RX 637 (ALT 250 FOR IP): Performed by: ORTHOPAEDIC SURGERY

## 2023-06-27 PROCEDURE — APPNB45 APP NON BILLABLE 31-45 MINUTES: Performed by: NURSE PRACTITIONER

## 2023-06-27 PROCEDURE — 97162 PT EVAL MOD COMPLEX 30 MIN: CPT

## 2023-06-27 PROCEDURE — 2580000003 HC RX 258: Performed by: ORTHOPAEDIC SURGERY

## 2023-06-27 PROCEDURE — 97112 NEUROMUSCULAR REEDUCATION: CPT

## 2023-06-27 RX ORDER — ACETAMINOPHEN 325 MG/1
650 TABLET ORAL 3 TIMES DAILY
Status: DISCONTINUED | OUTPATIENT
Start: 2023-06-27 | End: 2023-06-28 | Stop reason: HOSPADM

## 2023-06-27 RX ORDER — TRAMADOL HYDROCHLORIDE 50 MG/1
50 TABLET ORAL EVERY 6 HOURS PRN
Status: DISCONTINUED | OUTPATIENT
Start: 2023-06-27 | End: 2023-06-28 | Stop reason: HOSPADM

## 2023-06-27 RX ORDER — ASPIRIN 81 MG/1
81 TABLET ORAL 2 TIMES DAILY
Qty: 60 TABLET | Refills: 0 | Status: SHIPPED | OUTPATIENT
Start: 2023-06-27 | End: 2023-07-27

## 2023-06-27 RX ORDER — TRAMADOL HYDROCHLORIDE 50 MG/1
50 TABLET ORAL EVERY 6 HOURS PRN
Qty: 20 TABLET | Refills: 0 | Status: SHIPPED | OUTPATIENT
Start: 2023-06-27 | End: 2023-07-02

## 2023-06-27 RX ADMIN — LISINOPRIL 40 MG: 20 TABLET ORAL at 09:47

## 2023-06-27 RX ADMIN — OXYCODONE HYDROCHLORIDE 10 MG: 5 TABLET ORAL at 19:06

## 2023-06-27 RX ADMIN — CARVEDILOL 25 MG: 25 TABLET, FILM COATED ORAL at 19:05

## 2023-06-27 RX ADMIN — LEVOTHYROXINE SODIUM 50 MCG: 0.03 TABLET ORAL at 06:59

## 2023-06-27 RX ADMIN — ACETAMINOPHEN 650 MG: 325 TABLET ORAL at 14:21

## 2023-06-27 RX ADMIN — ACETAMINOPHEN 650 MG: 325 TABLET ORAL at 19:09

## 2023-06-27 RX ADMIN — FLUTICASONE PROPIONATE 1 SPRAY: 50 SPRAY, METERED NASAL at 09:00

## 2023-06-27 RX ADMIN — CEFAZOLIN 2000 MG: 2 INJECTION, POWDER, FOR SOLUTION INTRAMUSCULAR; INTRAVENOUS at 04:02

## 2023-06-27 RX ADMIN — SODIUM CHLORIDE, PRESERVATIVE FREE 10 ML: 5 INJECTION INTRAVENOUS at 09:47

## 2023-06-27 RX ADMIN — PRAVASTATIN SODIUM 80 MG: 80 TABLET ORAL at 09:47

## 2023-06-27 RX ADMIN — OXYCODONE HYDROCHLORIDE 5 MG: 5 TABLET ORAL at 09:51

## 2023-06-27 RX ADMIN — ESCITALOPRAM OXALATE 5 MG: 10 TABLET ORAL at 19:09

## 2023-06-27 RX ADMIN — CETIRIZINE HYDROCHLORIDE 10 MG: 10 TABLET, FILM COATED ORAL at 09:47

## 2023-06-27 RX ADMIN — CARVEDILOL 25 MG: 25 TABLET, FILM COATED ORAL at 09:55

## 2023-06-27 RX ADMIN — ENOXAPARIN SODIUM 40 MG: 100 INJECTION SUBCUTANEOUS at 09:00

## 2023-06-27 RX ADMIN — SODIUM CHLORIDE, PRESERVATIVE FREE 10 ML: 5 INJECTION INTRAVENOUS at 19:10

## 2023-06-27 RX ADMIN — ACETAMINOPHEN 650 MG: 325 TABLET ORAL at 09:54

## 2023-06-27 ASSESSMENT — PAIN DESCRIPTION - DESCRIPTORS
DESCRIPTORS: ACHING

## 2023-06-27 ASSESSMENT — PAIN SCALES - GENERAL
PAINLEVEL_OUTOF10: 0
PAINLEVEL_OUTOF10: 3
PAINLEVEL_OUTOF10: 7
PAINLEVEL_OUTOF10: 0
PAINLEVEL_OUTOF10: 5

## 2023-06-27 ASSESSMENT — PAIN - FUNCTIONAL ASSESSMENT
PAIN_FUNCTIONAL_ASSESSMENT: ACTIVITIES ARE NOT PREVENTED

## 2023-06-27 ASSESSMENT — PAIN DESCRIPTION - FREQUENCY
FREQUENCY: INTERMITTENT
FREQUENCY: INTERMITTENT

## 2023-06-27 ASSESSMENT — PAIN SCALES - WONG BAKER
WONGBAKER_NUMERICALRESPONSE: 0
WONGBAKER_NUMERICALRESPONSE: 6
WONGBAKER_NUMERICALRESPONSE: 0

## 2023-06-27 ASSESSMENT — PAIN DESCRIPTION - PAIN TYPE
TYPE: ACUTE PAIN
TYPE: ACUTE PAIN

## 2023-06-27 ASSESSMENT — PAIN DESCRIPTION - ORIENTATION
ORIENTATION: RIGHT

## 2023-06-27 ASSESSMENT — PAIN DESCRIPTION - LOCATION
LOCATION: KNEE;HIP
LOCATION: HIP;KNEE
LOCATION: HIP

## 2023-06-28 VITALS
HEIGHT: 65 IN | WEIGHT: 105 LBS | HEART RATE: 60 BPM | OXYGEN SATURATION: 95 % | SYSTOLIC BLOOD PRESSURE: 159 MMHG | BODY MASS INDEX: 17.49 KG/M2 | TEMPERATURE: 97.2 F | RESPIRATION RATE: 18 BRPM | DIASTOLIC BLOOD PRESSURE: 56 MMHG

## 2023-06-28 LAB
ANION GAP SERPL CALCULATED.3IONS-SCNC: 7 MMOL/L (ref 3–16)
BASOPHILS # BLD: 0 K/UL (ref 0–0.2)
BASOPHILS NFR BLD: 0.3 %
BUN SERPL-MCNC: 13 MG/DL (ref 7–20)
CALCIUM SERPL-MCNC: 8.9 MG/DL (ref 8.3–10.6)
CHLORIDE SERPL-SCNC: 104 MMOL/L (ref 99–110)
CO2 SERPL-SCNC: 25 MMOL/L (ref 21–32)
CREAT SERPL-MCNC: 0.7 MG/DL (ref 0.6–1.2)
DEPRECATED RDW RBC AUTO: 16.2 % (ref 12.4–15.4)
EOSINOPHIL # BLD: 0.2 K/UL (ref 0–0.6)
EOSINOPHIL NFR BLD: 2.6 %
GFR SERPLBLD CREATININE-BSD FMLA CKD-EPI: >60 ML/MIN/{1.73_M2}
GLUCOSE SERPL-MCNC: 109 MG/DL (ref 70–99)
HCT VFR BLD AUTO: 32.5 % (ref 36–48)
HGB BLD-MCNC: 10.9 G/DL (ref 12–16)
LYMPHOCYTES # BLD: 1.1 K/UL (ref 1–5.1)
LYMPHOCYTES NFR BLD: 19.3 %
MCH RBC QN AUTO: 27.5 PG (ref 26–34)
MCHC RBC AUTO-ENTMCNC: 33.4 G/DL (ref 31–36)
MCV RBC AUTO: 82.2 FL (ref 80–100)
MONOCYTES # BLD: 0.5 K/UL (ref 0–1.3)
MONOCYTES NFR BLD: 8.3 %
NEUTROPHILS # BLD: 4.1 K/UL (ref 1.7–7.7)
NEUTROPHILS NFR BLD: 69.5 %
PLATELET # BLD AUTO: 237 K/UL (ref 135–450)
PMV BLD AUTO: 8.2 FL (ref 5–10.5)
POTASSIUM SERPL-SCNC: 4 MMOL/L (ref 3.5–5.1)
RBC # BLD AUTO: 3.95 M/UL (ref 4–5.2)
SODIUM SERPL-SCNC: 136 MMOL/L (ref 136–145)
WBC # BLD AUTO: 5.9 K/UL (ref 4–11)

## 2023-06-28 PROCEDURE — 2580000003 HC RX 258: Performed by: ORTHOPAEDIC SURGERY

## 2023-06-28 PROCEDURE — 6370000000 HC RX 637 (ALT 250 FOR IP): Performed by: ORTHOPAEDIC SURGERY

## 2023-06-28 PROCEDURE — APPNB45 APP NON BILLABLE 31-45 MINUTES: Performed by: NURSE PRACTITIONER

## 2023-06-28 PROCEDURE — 85025 COMPLETE CBC W/AUTO DIFF WBC: CPT

## 2023-06-28 PROCEDURE — 36415 COLL VENOUS BLD VENIPUNCTURE: CPT

## 2023-06-28 PROCEDURE — 6370000000 HC RX 637 (ALT 250 FOR IP): Performed by: NURSE PRACTITIONER

## 2023-06-28 PROCEDURE — 80048 BASIC METABOLIC PNL TOTAL CA: CPT

## 2023-06-28 PROCEDURE — 6360000002 HC RX W HCPCS: Performed by: ORTHOPAEDIC SURGERY

## 2023-06-28 RX ADMIN — LEVOTHYROXINE SODIUM 50 MCG: 0.03 TABLET ORAL at 05:49

## 2023-06-28 RX ADMIN — FLUTICASONE PROPIONATE 1 SPRAY: 50 SPRAY, METERED NASAL at 08:58

## 2023-06-28 RX ADMIN — ACETAMINOPHEN 650 MG: 325 TABLET ORAL at 05:49

## 2023-06-28 RX ADMIN — PRAVASTATIN SODIUM 80 MG: 80 TABLET ORAL at 08:58

## 2023-06-28 RX ADMIN — CETIRIZINE HYDROCHLORIDE 10 MG: 10 TABLET, FILM COATED ORAL at 08:57

## 2023-06-28 RX ADMIN — ENOXAPARIN SODIUM 40 MG: 100 INJECTION SUBCUTANEOUS at 08:56

## 2023-06-28 RX ADMIN — SODIUM CHLORIDE, PRESERVATIVE FREE 10 ML: 5 INJECTION INTRAVENOUS at 08:58

## 2023-06-28 RX ADMIN — LISINOPRIL 40 MG: 20 TABLET ORAL at 08:58

## 2023-06-28 RX ADMIN — ACETAMINOPHEN 650 MG: 325 TABLET ORAL at 08:57

## 2023-06-28 RX ADMIN — CARVEDILOL 25 MG: 25 TABLET, FILM COATED ORAL at 08:57

## 2023-06-28 ASSESSMENT — PAIN SCALES - WONG BAKER
WONGBAKER_NUMERICALRESPONSE: 0

## 2023-07-10 ENCOUNTER — OFFICE VISIT (OUTPATIENT)
Dept: ORTHOPEDIC SURGERY | Age: 83
End: 2023-07-10

## 2023-07-10 VITALS — HEIGHT: 65 IN | BODY MASS INDEX: 17.49 KG/M2 | WEIGHT: 105 LBS

## 2023-07-10 DIAGNOSIS — S72.001D CLOSED FRACTURE OF NECK OF RIGHT FEMUR WITH ROUTINE HEALING, SUBSEQUENT ENCOUNTER: Primary | ICD-10-CM

## 2023-07-10 DIAGNOSIS — Z98.890 S/P ORIF (OPEN REDUCTION INTERNAL FIXATION) FRACTURE: ICD-10-CM

## 2023-07-10 DIAGNOSIS — Z87.81 S/P ORIF (OPEN REDUCTION INTERNAL FIXATION) FRACTURE: ICD-10-CM

## 2023-07-10 PROCEDURE — 99024 POSTOP FOLLOW-UP VISIT: CPT | Performed by: ORTHOPAEDIC SURGERY

## 2023-07-10 NOTE — PROGRESS NOTES
[address] [facility]  Office Visit    Chief Complaint: Follow-up after right femoral neck fracture    HPI:  Sabra Yu is a 78 y. o. who is here in postoperative follow-up of a right femoral neck fracture for which she underwent ORIF on June 26, 2023. She is staying in a skilled nursing facility. She stands and walks with use of a walker and reports pain that is up to 4/10. She denies any issues today. Exam:  Height 5' 5\" (1.651 m), weight 105 lb (47.6 kg). Appearance: sitting in exam room chair, appears to be in no acute distress, awake and alert  Resp: unlabored breathing on room air  Skin: warm, dry and intact with out erythema or significant increased temperature  Neuro: grossly intact both lower extremities. Intact sensation to light touch. Motor exam 4+ to 5/5 in all major motor groups. RLE: Incision healing as expected with no signs of infection. She demonstrates active hip flexion with 4/5 strength. Motor function and sensation intact distally. Imaging:  AP pelvis, AP and frog-leg lateral views of the right hip were performed and interpreted today. Significant for a minimally displaced subcapital femoral neck fracture spanned by internal fixation hardware. Assessment:  Right femoral neck fracture status post ORIF    Plan:  She is recovering well postoperatively. She may continue to weight-bear as tolerated. Continue pain control as needed. Follow-up in 4 weeks for repeat assessment and radiographs. This dictation was done with Dragon dictation and may contain mechanical errors related to translation.

## 2023-07-13 ENCOUNTER — TELEPHONE (OUTPATIENT)
Dept: ORTHOPEDIC SURGERY | Age: 83
End: 2023-07-13

## 2023-07-13 NOTE — TELEPHONE ENCOUNTER
Faxing Patient [address] Name: [address] Name: Nahomi Delgadillo Number: [phone] EXT [address] [facility] 1788 Fax Number: [phone]    [facility] IS REQUESTING LAST OV NOTES

## 2023-08-07 ENCOUNTER — OFFICE VISIT (OUTPATIENT)
Dept: ORTHOPEDIC SURGERY | Age: 83
End: 2023-08-07

## 2023-08-07 VITALS — HEIGHT: 65 IN | BODY MASS INDEX: 17.47 KG/M2

## 2023-08-07 DIAGNOSIS — S72.001D CLOSED FRACTURE OF NECK OF RIGHT FEMUR WITH ROUTINE HEALING, SUBSEQUENT ENCOUNTER: Primary | ICD-10-CM

## 2023-08-07 PROCEDURE — 99024 POSTOP FOLLOW-UP VISIT: CPT | Performed by: PHYSICIAN ASSISTANT

## 2023-08-07 NOTE — PROGRESS NOTES
Subjective:      Patient ID: Janel To is a 78 y.o.  female. Chief Complaint   Patient presents with    Hip Pain     Right hip pinning 6/26/23          HPI:  Here for follow up post op visit. S/P right hip ORIF for valgus impacted femoral neck fracture. The date of procedure- 6/26/23. Surgeon: Dr Bay Agee  Pain: 0/10 VAS      Review of Systems:   Negative for fever or chills.      Past Medical History:   Diagnosis Date    Anxiety     Benign essential HTN     Breast cancer ([facility])     Cancer ([facility])     HLD (hyperlipidemia)     Thyroid disease     Vitamin D deficiency        Family History   Problem Relation Age of Onset    Thyroid Disease Mother     Cancer Mother     Heart Disease Father        Past Surgical History:   Procedure Laterality Date    BREAST SURGERY      HIP FRACTURE SURGERY Right 6/26/2023    OPEN REDUCTION INTERNAL FIXATION OF RIGHT HIP USING [facility] SYSTEM performed by Doyle Valerio MD at [address] [facility] (CERVIX STATUS UNKNOWN)         Social History     Occupational History    Not on file   Tobacco Use    Smoking status: Never    Smokeless tobacco: Never   Vaping Use    Vaping Use: Unknown   Substance and Sexual Activity    Alcohol use: Never    Drug use: Never    Sexual activity: Not Currently       Current Outpatient Medications   Medication Sig Dispense Refill    aspirin 81 MG EC tablet Take 1 tablet by mouth in the morning and at bedtime 60 tablet 0    carvedilol (COREG) 25 MG tablet Take 1 tablet by mouth 2 times daily (with meals)      escitalopram (LEXAPRO) 5 MG tablet Take 1 tablet by mouth at bedtime      carbamide peroxide (DEBROX) 6.5 % otic solution INSTILL 1 DROP IN BOTH EARS TWICE A DAY FOR EAR WAX BLOCKAGE      pravastatin (PRAVACHOL) 80 MG tablet Take 1 tablet by mouth daily 30 tablet 0    lisinopril (PRINIVIL;ZESTRIL) 20 MG tablet Take 1 tablet by mouth in the morning and at bedtime (Patient taking differently: Take 2 tablets by mouth daily) 60 tablet 0

## 2023-08-08 ENCOUNTER — TELEPHONE (OUTPATIENT)
Dept: ORTHOPEDIC SURGERY | Age: 83
End: 2023-08-08

## 2023-08-08 NOTE — TELEPHONE ENCOUNTER
Tamera from [facility] office visit notes faxed over from Aug 7th 2023  Earnestinene Roney:  [id number]    VYE:[phone] attn [phone]

## 2023-09-18 ENCOUNTER — OFFICE VISIT (OUTPATIENT)
Dept: ORTHOPEDIC SURGERY | Age: 83
End: 2023-09-18

## 2023-09-18 VITALS — HEIGHT: 65 IN | RESPIRATION RATE: 16 BRPM | BODY MASS INDEX: 17.49 KG/M2 | WEIGHT: 105 LBS

## 2023-09-18 DIAGNOSIS — S72.001D CLOSED FRACTURE OF NECK OF RIGHT FEMUR WITH ROUTINE HEALING, SUBSEQUENT ENCOUNTER: Primary | ICD-10-CM

## 2023-09-18 PROCEDURE — 99024 POSTOP FOLLOW-UP VISIT: CPT | Performed by: ORTHOPAEDIC SURGERY

## 2023-09-18 NOTE — PROGRESS NOTES
[address] [facility]  Office Visit    Chief Complaint: Follow-up after right femoral neck fracture    HPI:  Jam Ghosh is a 78 y. o. who is here in postoperative follow-up of a right femoral neck fracture for which she underwent ORIF on June 26, 2023. She is staying in a skilled nursing facility. She is generally in a wheelchair. She is here in a walker today and reports minimal symptoms. She contributes most of symptoms she does have the old age and does not appear to want to be very active. Exam:  Resp. rate 16, height 5' 5\" (1.651 m), weight 105 lb (47.6 kg). Appearance: sitting in exam room chair, appears to be in no acute distress, awake and alert  Resp: unlabored breathing on room air  Skin: warm, dry and intact with out erythema or significant increased temperature  Neuro: grossly intact both lower extremities. Intact sensation to light touch. Motor exam 4+ to 5/5 in all major motor groups. RLE: She demonstrates active hip flexion with 4/5 strength. Motor function and sensation intact distally. Imaging:  AP pelvis, AP and frog-leg lateral views of the right hip were performed and interpreted today. Significant for a minimally displaced subcapital femoral neck fracture spanned by internal fixation hardware. There is interval fracture healing. Assessment:  Right femoral neck fracture status post ORIF    Plan:  She is recovering well postoperatively. She may continue to weight-bear as tolerated and activity as tolerated. Follow-up here as needed. This dictation was done with Dragon dictation and may contain mechanical errors related to translation.

## 2023-09-19 ENCOUNTER — TELEPHONE (OUTPATIENT)
Dept: ORTHOPEDIC SURGERY | Age: 83
End: 2023-09-19

## 2023-09-19 NOTE — TELEPHONE ENCOUNTER
General Question     Subject: OV NOTES, WHEN IS Pt TO RETURN FOR F/U   Patient and /or Facility Request: [address] Number: [phone] (MED RECORDS)      PLEASE CALL ANNA WHEN/IF THE PT IS TO RETURN, AND PLEASE FAX OVER OV NOTES FROM THE LAST VISIT.      FAX TO:    [address] [facility]

## (undated) DEVICE — 3M™ TEGADERM™ TRANSPARENT FILM DRESSING FRAME STYLE, 1628, 6 IN X 8 IN (15 CM X 20 CM), 10/CT 8CT/CASE: Brand: 3M™ TEGADERM™

## (undated) DEVICE — SHEET,DRAPE,53X77,STERILE: Brand: MEDLINE

## (undated) DEVICE — STERILE POLYISOPRENE POWDER-FREE SURGICAL GLOVES WITH EMOLLIENT COATING: Brand: PROTEXIS

## (undated) DEVICE — DRESSING W4XL8IN ISLANDTHERABOND 3D

## (undated) DEVICE — MAT FLR W28XL40IN STD GRN 60% RECYCL MAT LO ABSRB SGL LAYR

## (undated) DEVICE — BIT DRL L413MM DIA4.3MM FOR FEM NK SYSTEN

## (undated) DEVICE — BANDAGE COBAN 4 IN COMPR W4INXL5YD FOAM COHESIVE QUIK STK SELF ADH SFT

## (undated) DEVICE — PADDING CAST N ADH 12X6 IN CRIMPED FINISH 100% COTTON WBRLII

## (undated) DEVICE — MAJOR SET UP PK

## (undated) DEVICE — STERILE LATEX POWDER-FREE SURGICAL GLOVESWITH NITRILE COATING: Brand: PROTEXIS

## (undated) DEVICE — COVER LT HNDL BLU PLAS

## (undated) DEVICE — APPLICATOR MEDICATED 26 CC SOLUTION HI LT ORNG CHLORAPREP

## (undated) DEVICE — DRESSING,GAUZE,XEROFORM,CURAD,1"X8",ST: Brand: CURAD

## (undated) DEVICE — SYRINGE, LUER LOCK, 10ML: Brand: MEDLINE

## (undated) DEVICE — Device: Brand: COVER, PERINEAL POST, 12 PK

## (undated) DEVICE — STAPLER SKIN H3.9MM WIRE DIA0.58MM CRWN 6.9MM 35 STPL ROT

## (undated) DEVICE — RESTRAINT EXT ANK WRST LT BLU FOAM 2 STRP SIDE BCKL HK AND

## (undated) DEVICE — GAUZE,SPONGE,4"X4",8PLY,STRL,LF,10/TRAY: Brand: MEDLINE

## (undated) DEVICE — PAD,ABDOMINAL,8"X10",ST,LF: Brand: MEDLINE

## (undated) DEVICE — 6617 IOBAN II PATIENT ISOLATION DRAPE 5/BX,4BX/CS: Brand: STERI-DRAPE™ IOBAN™ 2

## (undated) DEVICE — SUTURE VCRL + SZ 2-0 L36IN ABSRB UD L36MM CT-1 1/2 CIR VCP945H

## (undated) DEVICE — MERCY FAIRFIELD TURNOVER KIT: Brand: MEDLINE INDUSTRIES, INC.

## (undated) DEVICE — GUIDEWIRE ORTH L400MM DIA3.2MM FOR TFN

## (undated) DEVICE — NEEDLE,22GX1.5",REG,BEVEL: Brand: MEDLINE

## (undated) DEVICE — ELECTRODE PT RET AD L9FT HI MOIST COND ADH HYDRGEL CORDED

## (undated) DEVICE — SUTURE VCRL + SZ 2-0 L18IN ABSRB UD CT1 L36MM 1/2 CIR VCP839D